# Patient Record
Sex: FEMALE | Race: WHITE | NOT HISPANIC OR LATINO | Employment: FULL TIME | URBAN - METROPOLITAN AREA
[De-identification: names, ages, dates, MRNs, and addresses within clinical notes are randomized per-mention and may not be internally consistent; named-entity substitution may affect disease eponyms.]

---

## 2023-11-10 ENCOUNTER — TELEPHONE (OUTPATIENT)
Dept: NEUROSURGERY | Facility: CLINIC | Age: 63
End: 2023-11-10
Payer: COMMERCIAL

## 2023-11-10 ENCOUNTER — PATIENT MESSAGE (OUTPATIENT)
Dept: NEUROSURGERY | Facility: CLINIC | Age: 63
End: 2023-11-10
Payer: COMMERCIAL

## 2023-11-10 NOTE — TELEPHONE ENCOUNTER
Spoke with patient in regards to reschedule appointment for today, due to MD having an ER surgery case. She has been scheduled for 11/14 @ 2PM for an appointment. Patient confirmed appointment date and time. She will be bringing in her imaging disc and records.

## 2023-11-14 ENCOUNTER — TELEPHONE (OUTPATIENT)
Dept: NEUROSURGERY | Facility: CLINIC | Age: 63
End: 2023-11-14
Payer: COMMERCIAL

## 2023-11-14 ENCOUNTER — OFFICE VISIT (OUTPATIENT)
Dept: NEUROSURGERY | Facility: CLINIC | Age: 63
End: 2023-11-14
Payer: COMMERCIAL

## 2023-11-14 ENCOUNTER — HOSPITAL ENCOUNTER (OUTPATIENT)
Dept: RADIOLOGY | Facility: HOSPITAL | Age: 63
Discharge: HOME | End: 2023-11-14
Attending: NEUROLOGICAL SURGERY
Payer: COMMERCIAL

## 2023-11-14 VITALS
SYSTOLIC BLOOD PRESSURE: 164 MMHG | HEIGHT: 66 IN | DIASTOLIC BLOOD PRESSURE: 87 MMHG | WEIGHT: 179.69 LBS | HEART RATE: 114 BPM | BODY MASS INDEX: 28.88 KG/M2

## 2023-11-14 DIAGNOSIS — M41.50 SCOLIOSIS DUE TO DEGENERATIVE DISEASE OF SPINE IN ADULT PATIENT: ICD-10-CM

## 2023-11-14 DIAGNOSIS — M50.30 DEGENERATIVE DISC DISEASE, CERVICAL: ICD-10-CM

## 2023-11-14 DIAGNOSIS — T14.8XXA MUSCULOLIGAMENTOUS STRAIN: ICD-10-CM

## 2023-11-14 DIAGNOSIS — G56.00 CARPAL TUNNEL SYNDROME, UNSPECIFIED LATERALITY: ICD-10-CM

## 2023-11-14 DIAGNOSIS — M50.30 DEGENERATIVE DISC DISEASE, CERVICAL: Primary | ICD-10-CM

## 2023-11-14 PROCEDURE — 4010F ACE/ARB THERAPY RXD/TAKEN: CPT | Mod: CPTII,S$GLB,, | Performed by: NEUROLOGICAL SURGERY

## 2023-11-14 PROCEDURE — 72084 X-RAY EXAM ENTIRE SPI 6/> VW: CPT | Mod: 26,,, | Performed by: RADIOLOGY

## 2023-11-14 PROCEDURE — 3077F SYST BP >= 140 MM HG: CPT | Mod: CPTII,S$GLB,, | Performed by: NEUROLOGICAL SURGERY

## 2023-11-14 PROCEDURE — 3044F HG A1C LEVEL LT 7.0%: CPT | Mod: CPTII,S$GLB,, | Performed by: NEUROLOGICAL SURGERY

## 2023-11-14 PROCEDURE — 72082 X-RAY EXAM ENTIRE SPI 2/3 VW: CPT | Mod: TC

## 2023-11-14 PROCEDURE — 3079F DIAST BP 80-89 MM HG: CPT | Mod: CPTII,S$GLB,, | Performed by: NEUROLOGICAL SURGERY

## 2023-11-14 PROCEDURE — 99999 PR PBB SHADOW E&M-EST. PATIENT-LVL III: CPT | Mod: PBBFAC,,, | Performed by: NEUROLOGICAL SURGERY

## 2023-11-14 PROCEDURE — 72084 XR SPINE SURVEY AP AND LATERAL: ICD-10-PCS | Mod: 26,,, | Performed by: RADIOLOGY

## 2023-11-14 PROCEDURE — 72050 X-RAY EXAM NECK SPINE 4/5VWS: CPT | Mod: TC,59

## 2023-11-14 PROCEDURE — 3061F NEG MICROALBUMINURIA REV: CPT | Mod: CPTII,S$GLB,, | Performed by: NEUROLOGICAL SURGERY

## 2023-11-14 PROCEDURE — 99204 OFFICE O/P NEW MOD 45 MIN: CPT | Mod: S$GLB,,, | Performed by: NEUROLOGICAL SURGERY

## 2023-11-14 PROCEDURE — 3066F NEPHROPATHY DOC TX: CPT | Mod: CPTII,S$GLB,, | Performed by: NEUROLOGICAL SURGERY

## 2023-11-14 PROCEDURE — 3008F BODY MASS INDEX DOCD: CPT | Mod: CPTII,S$GLB,, | Performed by: NEUROLOGICAL SURGERY

## 2023-11-14 NOTE — PROGRESS NOTES
Subjective:      Patient ID: Elina Franco is a 63 y.o. female.    Chief Complaint: Follow-up (Pt visit for surgery consult rt neck pain 7/10 numbness and tingling in hands and feet pain raditing down center back to lower back , hips and legs ) and Cervical Spine Pain (C-spine)    Patient presents as new evaluation for both neck as well as lower back pain  Imaging completed on out the disc and she has those to be uploaded into our radiology system     She complains of neck pain rated 7/10  She has numbness and tingling traveling from her neck into her hands bilaterally   tried epidural steroid injections in the past for her neck  Medications have not been able to alleviate the symptoms    In terms of her lower back she has lower back as well as pain radiating down to her feet through the hips knees  Associated with numbness and tingling    No prior spinal surgery      Pt here for neck and back       1. XR [facility] AP   2. Upload CD into PACS   3. NCS           Review of Systems   Constitutional:  Negative for activity change, appetite change and chills.   HENT:  Negative for hearing loss, sore throat and tinnitus.    Eyes:  Negative for pain, discharge and itching.   Cardiovascular:  Negative for chest pain.   Gastrointestinal:  Negative for abdominal pain.   Endocrine: Negative for cold intolerance and heat intolerance.   Genitourinary:  Negative for difficulty urinating and dysuria.   Musculoskeletal:  Positive for back pain and gait problem.   Allergic/Immunologic: Negative for environmental allergies.   Neurological:  Positive for weakness. Negative for dizziness, tremors, light-headedness and headaches.   Hematological:  Negative for adenopathy.   Psychiatric/Behavioral:  Negative for agitation, behavioral problems and confusion.          Objective:       Neurosurgery Physical Exam  Ortho/SPM Exam  Ortho Exam        Assessment:     1. Degenerative disc disease, cervical    2. Carpal tunnel syndrome, unspecified  laterality    3. Musculoligamentous strain    4. Scoliosis due to degenerative disease of spine in adult patient      Plan:     Degenerative disc disease, cervical  -     X-Ray Spine Scoliosis 4 or 5 views; Future; Expected date: 11/14/2023  -     EMG W/ ULTRASOUND AND NERVE CONDUCTION TEST 2 Extremities; Future    Carpal tunnel syndrome, unspecified laterality  -     X-Ray Spine Scoliosis 4 or 5 views; Future; Expected date: 11/14/2023  -     EMG W/ ULTRASOUND AND NERVE CONDUCTION TEST 2 Extremities; Future    Musculoligamentous strain  -     X-Ray Spine Scoliosis 4 or 5 views; Future; Expected date: 11/14/2023  -     EMG W/ ULTRASOUND AND NERVE CONDUCTION TEST 2 Extremities; Future    Scoliosis due to degenerative disease of spine in adult patient  -     X-Ray Spine Scoliosis 4 or 5 views; Future; Expected date: 11/14/2023  -     EMG W/ ULTRASOUND AND NERVE CONDUCTION TEST 2 Extremities; Future      Patient with multiple pain generators but her symptoms in the cervical spine seemed to be the main complaint for her  Would like to get an EMG nerve conduction study to rule out any underlying peripheral nerve compression which goes along with the cervical degenerative disc disease she has through multiple levels  Symptoms in the cervical spine are worse at the C4-5, C5-6, C6-7 levels with the C5-6 level being the worst    I would also get like to get standing AP x-rays of the spinal axis to evaluate her overall sagittal alignment    Follow-up in clinic after discuss findings and discuss future surgical treatment options    Thank you for the referral   Please call with any questions    Solis Morales MD  Neurosurgery     Disclaimer: This note was prepared using a voice recognition system and is likely to have sound alike errors within the text.

## 2023-11-14 NOTE — TELEPHONE ENCOUNTER
----- Message from Saundra José sent at 11/14/2023  1:28 PM CST -----  .Type:  Patient Call Back    Who Called: PT        Does the patient know what this is regarding?: PT WOULD LIKE TO KNOW CAN SHE BE BILLED FOR TODAY'S VISIT? PT WOULD LIKE TO KNOW BEFORE SHE COMES TO THE VISIT     Would the patient rather a call back YES     Best Call Back Number:   [phone]       Additional Information: Thank You

## 2023-11-14 NOTE — TELEPHONE ENCOUNTER
----- Message from Saundra José sent at 11/14/2023  1:28 PM CST -----  .Type:  Patient Call Back    Who Called: PT        Does the patient know what this is regarding?: PT WOULD LIKE TO KNOW CAN SHE BE BILLED FOR TODAY'S VISIT? PT WOULD LIKE TO KNOW BEFORE SHE COMES TO THE VISIT     Would the patient rather a call back YES     Best Call Back Number:   [phone]       Additional Information: Thank You

## 2023-11-15 ENCOUNTER — PATIENT MESSAGE (OUTPATIENT)
Dept: NEUROSURGERY | Facility: CLINIC | Age: 63
End: 2023-11-15
Payer: COMMERCIAL

## 2023-11-15 ENCOUNTER — TELEPHONE (OUTPATIENT)
Dept: PHYSICAL MEDICINE AND REHAB | Facility: CLINIC | Age: 63
End: 2023-11-15
Payer: COMMERCIAL

## 2023-11-15 NOTE — TELEPHONE ENCOUNTER
----- Message from Shy Boucher MA sent at 11/15/2023  1:05 PM CST -----  Regarding: EMG  Didier Herrera    Can you please schedule patient for EMG orders are in. Thanks in advance.

## 2023-11-28 ENCOUNTER — OFFICE VISIT (OUTPATIENT)
Dept: PHYSICAL MEDICINE AND REHAB | Facility: CLINIC | Age: 63
End: 2023-11-28
Payer: COMMERCIAL

## 2023-11-28 VITALS
WEIGHT: 179 LBS | HEART RATE: 83 BPM | HEIGHT: 66 IN | BODY MASS INDEX: 28.77 KG/M2 | DIASTOLIC BLOOD PRESSURE: 83 MMHG | SYSTOLIC BLOOD PRESSURE: 146 MMHG | RESPIRATION RATE: 13 BRPM

## 2023-11-28 DIAGNOSIS — G56.03 BILATERAL CARPAL TUNNEL SYNDROME: Primary | ICD-10-CM

## 2023-11-28 DIAGNOSIS — M54.12 CERVICAL RADICULOPATHY: ICD-10-CM

## 2023-11-28 PROBLEM — I15.1 HYPERTENSION SECONDARY TO OTHER RENAL DISORDERS: Status: ACTIVE | Noted: 2018-09-26

## 2023-11-28 PROBLEM — Z78.9 STATIN INTOLERANCE: Status: ACTIVE | Noted: 2018-04-10

## 2023-11-28 PROBLEM — F41.9 ANXIETY: Status: ACTIVE | Noted: 2021-04-19

## 2023-11-28 PROBLEM — F33.1 MODERATE EPISODE OF RECURRENT MAJOR DEPRESSIVE DISORDER: Status: ACTIVE | Noted: 2023-11-28

## 2023-11-28 PROBLEM — F51.04 PSYCHOPHYSIOLOGICAL INSOMNIA: Status: ACTIVE | Noted: 2020-04-09

## 2023-11-28 PROBLEM — M15.9 PRIMARY OSTEOARTHRITIS INVOLVING MULTIPLE JOINTS: Status: ACTIVE | Noted: 2019-02-28

## 2023-11-28 PROBLEM — G25.81 RESTLESS LEG SYNDROME: Status: ACTIVE | Noted: 2021-04-19

## 2023-11-28 PROBLEM — N18.2 CHRONIC KIDNEY DISEASE, STAGE 2 (MILD): Status: ACTIVE | Noted: 2018-07-10

## 2023-11-28 PROBLEM — E78.2 MIXED HYPERLIPIDEMIA: Status: ACTIVE | Noted: 2023-11-28

## 2023-11-28 PROBLEM — R73.01 IMPAIRED FASTING GLUCOSE: Status: ACTIVE | Noted: 2023-11-28

## 2023-11-28 PROBLEM — M50.30 DDD (DEGENERATIVE DISC DISEASE), CERVICAL: Status: ACTIVE | Noted: 2019-02-28

## 2023-11-28 PROBLEM — N04.0: Status: ACTIVE | Noted: 2019-11-04

## 2023-11-28 PROBLEM — M15.0 PRIMARY OSTEOARTHRITIS INVOLVING MULTIPLE JOINTS: Status: ACTIVE | Noted: 2019-02-28

## 2023-11-28 PROBLEM — K21.9 GERD (GASTROESOPHAGEAL REFLUX DISEASE): Status: ACTIVE | Noted: 2023-11-28

## 2023-11-28 PROCEDURE — 95886 MUSC TEST DONE W/N TEST COMP: CPT | Mod: S$GLB,,, | Performed by: PHYSICAL MEDICINE & REHABILITATION

## 2023-11-28 PROCEDURE — 99999 PR PBB SHADOW E&M-EST. PATIENT-LVL III: CPT | Mod: PBBFAC,,, | Performed by: PHYSICAL MEDICINE & REHABILITATION

## 2023-11-28 PROCEDURE — 99499 UNLISTED E&M SERVICE: CPT | Mod: S$GLB,,, | Performed by: PHYSICAL MEDICINE & REHABILITATION

## 2023-11-28 PROCEDURE — 99999 PR PBB SHADOW E&M-EST. PATIENT-LVL III: ICD-10-PCS | Mod: PBBFAC,,, | Performed by: PHYSICAL MEDICINE & REHABILITATION

## 2023-11-28 PROCEDURE — 95886 PR EMG COMPLETE, W/ NERVE CONDUCTION STUDIES, 5+ MUSCLES: ICD-10-PCS | Mod: S$GLB,,, | Performed by: PHYSICAL MEDICINE & REHABILITATION

## 2023-11-28 PROCEDURE — 99499 NO LOS: ICD-10-PCS | Mod: S$GLB,,, | Performed by: PHYSICAL MEDICINE & REHABILITATION

## 2023-11-28 PROCEDURE — 95912 PR NERVE CONDUCTION STUDY; 11 -12 STUDIES: ICD-10-PCS | Mod: S$GLB,,, | Performed by: PHYSICAL MEDICINE & REHABILITATION

## 2023-11-28 PROCEDURE — 95912 NRV CNDJ TEST 11-12 STUDIES: CPT | Mod: S$GLB,,, | Performed by: PHYSICAL MEDICINE & REHABILITATION

## 2023-11-28 NOTE — PROGRESS NOTES
[facility]   [id number]  [place] [address]  Phone: [phone]        Full Name: Elina Franco YOB: 1960  Patient ID: [id number]      Visit Date: 11/28/2023 13:40  Age: 63 Years 5 Months Old  Examining Physician: Henrietta Aldrich M.D.  Referring Physician:   Reason for Referral: ue pain          Chief Complaint   Patient presents with    Numbness       HPI: This is a 63 y.o.  female being seen in clinic today for evaluation of chronic arm achy pain with numbness/tingling into her fingers.  She has a history of CTR years ago as well as cervical DJD/DDD.  With increased hand/arm usage her symptoms can worsen.  She also has night time awakening from pain    History obtained from patient    Past family, medical, social, and surgical history reviewed in chart    Review of Systems:     General- denies lethargy, weight change, fever, chills  Head/neck- denies swallowing difficulties  ENT- denies hearing changes  Cardiovascular-denies chest pain  Pulmonary- denies shortness of breath  GI- denies constipation or bowel incontinence  - denies bladder incontinence  Skin- denies wounds or rashes  Musculoskeletal- denies weakness, + pain  Neurologic- + numbness and tingling  Psychiatric- denies depressive or psychotic features, + anxiety  Lymphatic-denies swelling  Endocrine- denies hypoglycemic symptoms/DM history  All other pertinent systems negative     Physical Examination:  General: Well developed, well nourished female, NAD  HEENT:NCAT EOMI bilaterally   Pulmonary:Normal respirations    Spinal Examination: CERVICAL  Active ROM is within normal limits.  Inspection: No deformity of spinal alignment.    Musculoskeletal Tests:  Phalen: neg  Elbow compression (ulnar): neg  Tinels at wrist: neg    Bilateral Upper and Lower Extremities:  Pulses are 2+ at radial bilaterally.  Shoulder/Elbow/Wrist/Hand ROM wnl  Hip/Knee/Ankle ROM   Bilateral Extremities show normal capillary refill.   No signs of cyanosis, rubor, edema, skin changes, or dysvascular changes of appendages.  Nails appear intact.    Neurological Exam:  Cranial Nerves:  II-XII grossly intact    Manual Muscle Testing: (Motor 5=normal)  5/5 strength bilateral upper extremities    No focal atrophy is noted of either upper extremity.    Bilateral Reflexes:  Sebastian's response is absent bilaterally.  Sensation: tested to light touch  - intact in arms.    Gait: Narrow base and good arm swing.      Entire procedure explained to patient prior to proceeding.  Verbal consent obtained      SNC      Nerve / Sites Rec. Site Onset Lat Peak Lat Amp Segments Distance Velocity     ms ms µV  mm m/s   R Median - Digit II (Antidromic)      Wrist Dig II 3.1 3.8 12.8 Wrist - Dig  46   L Median - Digit II (Antidromic)      Wrist Dig II 3.1 4.0 17.7 Wrist - Dig  45   R Ulnar - Digit V (Antidromic)      Wrist Dig V 2.8 3.4 12.4 Wrist - Dig V 140 51   L Ulnar - Digit V (Antidromic)      Wrist Dig V 2.9 3.6 21.6 Wrist - Dig V 140 48   R Radial - Anatomical snuff box (Forearm)      Forearm Wrist 1.9 2.2 16.6 Forearm - Wrist 100 52   L Radial - Anatomical snuff box (Forearm)      Forearm Wrist 1.7 2.4 13.8 Forearm - Wrist 100 60       CSI      Nerve / Sites Rec. Site Peak Lat NP Amp Segments Peak Diff     ms µV  ms   R Median - CSI      Median Thumb 3.2 14.8 Median - Radial 0.6      Radial Thumb 2.6 0.79 Median - Ulnar 0.6      Median Ring 4.2 9.0 Median palm - Ulnar palm       Ulnar Ring 3.5 16.6        CSI    CSI 1.2       MNC      Nerve / Sites Muscle Latency Amplitude Duration Rel Amp Segments Distance Lat Diff Velocity     ms mV ms %  mm ms m/s   R Median - APB      Wrist APB 3.8 8.4 6.0 100 Wrist - APB 80        Elbow APB 7.9 8.0 6.2 95.1 Elbow - Wrist 200 4.2 48   L Median - APB      Wrist APB 3.6 7.8 6.0 100 Wrist - APB 80        Elbow APB 7.9 7.5 6.5 96.2 Elbow - Wrist 200 4.2 47   R Ulnar - ADM      Wrist ADM 2.9 9.0 5.8 100 Wrist -          B. Elbow ADM 6.5 8.1 6.0 90.4 B. Elbow - Wrist 210 3.5 59      A. Elbow ADM 8.5 7.1 6.3 87.7 A. Elbow - B. Elbow 110 2.1 53   L Ulnar - ADM      Wrist ADM 3.0 10.1 6.4 100 Wrist -         B. Elbow ADM 6.7 10.0 6.7 98.3 B. Elbow - Wrist 200 3.6 55      A. Elbow ADM 8.7 9.7 7.4 96.9 A. Elbow - B. Elbow 110 2.0 54       EMG         EMG Summary Table     Spontaneous MUAP Recruitment   Muscle IA Fib PSW Fasc Other Dur. Dur Amp Dur Polys Pattern Effort   R. First dorsal interosseous N None None None . _NFT_ _NFT_ N N N N Max   R. Pronator teres N None None None . _NFT_ _NFT_ N Sl Incr N N Max   R. Brachioradialis N None None None . _NFT_ _NFT_ N N 1+ sl red Max   R. Biceps brachii N None None None . _NFT_ _NFT_ N N 1+ sl red Max   R. Triceps brachii N None None None . _NFT_ _NFT_ N N N N Max   R. Deltoid N None None None . _NFT_ _NFT_ N N 1+ sl red Max                                                INTERPRETATION  -Bilateral median motor nerve conduction study showed normal latency, amplitude, and dec conduction velocity  -Bilateral median sensory nerve conduction study showed prolonged peak latency and normal amplitude  -Bilateral ulnar motor nerve conduction study showed normal latency, amplitude, and conduction velocity  -Bilateral ulnar sensory nerve conduction study showed normal peak latency and amplitude  -Bilateral radial sensory nerve conduction study showed normal peak latency and amplitude  -right combined sensory index was significant  -Needle EMG examination performed to above mentioned muscles       IMPRESSION  ABNORMAL study  2. There is electrodiagnostic evidence of a mild-moderate demyelinating median neuropathy (Carpal tunnel syndrome) across bilateral wrists. There is a chronic radiculopathy of the C5, C6 nerve roots    PLAN  Discussed in detail for greater than 30 minutes about diagnosis and treatment plan    1. Follow up with referring provider: Dr. Solis Morales  2. Handouts on  cervical radic and CTS provided  3. This study is good for one year. If symptoms worsen or do not improve, please re-consult.    Henrietta Aldrich M.D.  Physical Medicine and Rehab

## 2023-11-30 ENCOUNTER — PATIENT MESSAGE (OUTPATIENT)
Dept: NEUROSURGERY | Facility: CLINIC | Age: 63
End: 2023-11-30
Payer: COMMERCIAL

## 2024-01-05 ENCOUNTER — OFFICE VISIT (OUTPATIENT)
Dept: NEUROSURGERY | Facility: CLINIC | Age: 64
End: 2024-01-05
Payer: COMMERCIAL

## 2024-01-05 DIAGNOSIS — M50.30 DEGENERATIVE DISC DISEASE, CERVICAL: ICD-10-CM

## 2024-01-05 DIAGNOSIS — M54.12 CERVICAL RADICULOPATHY: ICD-10-CM

## 2024-01-05 DIAGNOSIS — M48.02 CERVICAL STENOSIS OF SPINAL CANAL: Primary | ICD-10-CM

## 2024-01-05 DIAGNOSIS — G56.03 BILATERAL CARPAL TUNNEL SYNDROME: ICD-10-CM

## 2024-01-05 PROCEDURE — 99213 OFFICE O/P EST LOW 20 MIN: CPT | Mod: S$PBB,,, | Performed by: NEUROLOGICAL SURGERY

## 2024-01-05 PROCEDURE — 4010F ACE/ARB THERAPY RXD/TAKEN: CPT | Mod: ,,, | Performed by: NEUROLOGICAL SURGERY

## 2024-01-05 PROCEDURE — 3061F NEG MICROALBUMINURIA REV: CPT | Mod: ,,, | Performed by: NEUROLOGICAL SURGERY

## 2024-01-05 PROCEDURE — 3066F NEPHROPATHY DOC TX: CPT | Mod: ,,, | Performed by: NEUROLOGICAL SURGERY

## 2024-01-05 PROCEDURE — 99999 PR PBB SHADOW E&M-EST. PATIENT-LVL III: CPT | Mod: PBBFAC,,, | Performed by: NEUROLOGICAL SURGERY

## 2024-01-05 RX ORDER — METHOCARBAMOL 500 MG/1
500 TABLET, FILM COATED ORAL 4 TIMES DAILY
Qty: 40 TABLET | Refills: 0 | Status: SHIPPED | OUTPATIENT
Start: 2024-01-05 | End: 2024-01-15

## 2024-01-05 NOTE — PROGRESS NOTES
Subjective:      Patient ID: Elina Franco is a 63 y.o. female.    Chief Complaint: Follow-up (Pain is located down center of back. Pain is 8/10)    Patient presents follows up for her neck pain with radiculopathy in bilateral extremities. She endorses pain in neck and mid upper spine. She states pain is 8/10. Reports pain is constant. She is s/p NCS. It demonstrated carpal tunnel syndrome bilaterally. Most severe stenosis at C4-5 and C5-6 per X ray, additionally collapsed / degenenative disc changes. Patient has issues with ROM. Pain exacerbated with rotation to right/ left.   On exam, patient with atrophy in right hand (thenar region); weakness in  BL        Review of Systems   Constitutional:  Negative for activity change, appetite change and chills.   HENT:  Negative for congestion and ear pain.    Eyes:  Negative for photophobia, redness and visual disturbance.   Respiratory:  Negative for apnea and chest tightness.    Cardiovascular:  Negative for chest pain.   Gastrointestinal:  Negative for abdominal distention and abdominal pain.   Endocrine: Negative for cold intolerance.   Genitourinary:  Negative for difficulty urinating.   Musculoskeletal:  Positive for myalgias, neck pain and neck stiffness. Negative for arthralgias.   Skin:  Negative for color change.   Allergic/Immunologic: Negative for environmental allergies.   Neurological:  Positive for weakness and numbness. Negative for dizziness.   Hematological:  Negative for adenopathy. Does not bruise/bleed easily.   Psychiatric/Behavioral:  Negative for agitation, behavioral problems and confusion.          Objective:       Physical Exam:  Nursing note and vitals reviewed.    Constitutional: She appears well-nourished. No distress.     Eyes: EOM are normal.     Cardiovascular: Normal rate.     Psych/Behavior: She is alert. She is oriented to person, place, and time. She has a normal mood and affect.     Musculoskeletal:        Neck: Range of motion is  limited. There is tenderness. Muscle strength is 5/5.        Right Upper Extremities: There is no tenderness. Muscle strength is 5/5.        Left Upper Extremities: There is no tenderness. Muscle strength is 5/5.     Neurological:        Sensory: There is no sensory deficit in the trunk. There is no sensory deficit in the extremities.        Cranial nerves: Cranial nerve(s) II, III, IV, V, VI, VII, VIII, IX, X, XI and XII are intact.     General    Nursing note and vitals reviewed.  Constitutional: She is oriented to person, place, and time. She appears well-nourished. No distress.   Eyes: EOM are normal.   Cardiovascular:  Normal rate.            Neurological: She is alert and oriented to person, place, and time.   Psychiatric: She has a normal mood and affect.             Ortho Exam      X-ray cervical spine    FINDINGS:  Vertebral body heights maintained.  No spondylolisthesis.  Degenerative disc height loss and both anterior and posterior osteophyte changes noted at C4-5 and C5-6.  Uncovertebral spurring noted at these levels causing mild neural foraminal narrowing.  No high-grade neural foraminal narrowing present.  Facet arthropathy noted.  Soft tissues unremarkable.     Impression:     Degenerative findings most prevalent at C4-5 and C5-6.          I  reviewed all pertinent imaging regarding this case.  Assessment:     1. Cervical stenosis of spinal canal    2. Bilateral carpal tunnel syndrome    3. Degenerative disc disease, cervical    4. Cervical radiculopathy      Plan:     Cervical stenosis of spinal canal  -     Ambulatory referral/consult to Physical/Occupational Therapy; Future; Expected date: 01/12/2024  -     Ambulatory referral/consult to Pain Clinic; Future; Expected date: 01/12/2024  -     Ambulatory referral/consult to Physical/Occupational Therapy; Future; Expected date: 01/12/2024    Bilateral carpal tunnel syndrome    Degenerative disc disease, cervical    Cervical radiculopathy    Other  orders  -     methocarbamoL (ROBAXIN) 500 MG Tab; Take 1 tablet (500 mg total) by mouth 4 (four) times daily. for 10 days  Dispense: 40 tablet; Refill: 0        Given her neck pain is multifactorial, advise JOJO (cervical) to see determine cervical etiology versus Carpal tunnel syndrome.     Target c6-7 intralaminar and up. Follow up 3-4 weeks after injections    Physical therapy with traction        Thank you for the referral   Please call with any questions    Solis Morales MD  Neurosurgery     Disclaimer: This note was prepared using a voice recognition system and is likely to have sound alike errors within the text.

## 2024-02-05 ENCOUNTER — TELEPHONE (OUTPATIENT)
Dept: PAIN MEDICINE | Facility: CLINIC | Age: 64
End: 2024-02-05
Payer: MEDICARE

## 2024-02-05 ENCOUNTER — PATIENT MESSAGE (OUTPATIENT)
Dept: NEUROSURGERY | Facility: CLINIC | Age: 64
End: 2024-02-05
Payer: MEDICARE

## 2024-02-05 NOTE — TELEPHONE ENCOUNTER
Pt call wanting to know if she would receive an injection on her New pt appointment with Dr González blackwood 2/6/24.    .Delai Carranza [facility]

## 2024-02-05 NOTE — TELEPHONE ENCOUNTER
----- Message from Teto Lo MA sent at 2/5/2024  1:49 PM CST -----  Contact: Pt  Patient wants instruction for procedure tomorrow  ----- Message -----  From: Doug Hurtado  Sent: 2/5/2024  12:30 PM CST  To: [facility] Staff    pt states she is coming in tomorrow for an injection for neck pain and is calling to get the instructions on what she should do to prepare & can be reached at [phone] & pt portal //thanks/Dbw

## 2024-02-06 ENCOUNTER — OFFICE VISIT (OUTPATIENT)
Dept: PAIN MEDICINE | Facility: CLINIC | Age: 64
End: 2024-02-06
Payer: COMMERCIAL

## 2024-02-06 VITALS
WEIGHT: 178.81 LBS | SYSTOLIC BLOOD PRESSURE: 133 MMHG | HEIGHT: 66 IN | BODY MASS INDEX: 28.74 KG/M2 | RESPIRATION RATE: 17 BRPM | HEART RATE: 76 BPM | DIASTOLIC BLOOD PRESSURE: 81 MMHG

## 2024-02-06 DIAGNOSIS — M48.02 CERVICAL STENOSIS OF SPINAL CANAL: ICD-10-CM

## 2024-02-06 DIAGNOSIS — M54.12 CERVICAL RADICULOPATHY: Primary | ICD-10-CM

## 2024-02-06 DIAGNOSIS — M50.90 CERVICAL DISC DISEASE: ICD-10-CM

## 2024-02-06 DIAGNOSIS — M47.812 CERVICAL SPONDYLOSIS: ICD-10-CM

## 2024-02-06 PROCEDURE — 3061F NEG MICROALBUMINURIA REV: CPT | Mod: CPTII,S$GLB,, | Performed by: ANESTHESIOLOGY

## 2024-02-06 PROCEDURE — 3008F BODY MASS INDEX DOCD: CPT | Mod: CPTII,S$GLB,, | Performed by: ANESTHESIOLOGY

## 2024-02-06 PROCEDURE — 99999 PR PBB SHADOW E&M-EST. PATIENT-LVL III: CPT | Mod: PBBFAC,,, | Performed by: ANESTHESIOLOGY

## 2024-02-06 PROCEDURE — 1159F MED LIST DOCD IN RCRD: CPT | Mod: CPTII,S$GLB,, | Performed by: ANESTHESIOLOGY

## 2024-02-06 PROCEDURE — 99204 OFFICE O/P NEW MOD 45 MIN: CPT | Mod: S$GLB,,, | Performed by: ANESTHESIOLOGY

## 2024-02-06 PROCEDURE — 3075F SYST BP GE 130 - 139MM HG: CPT | Mod: CPTII,S$GLB,, | Performed by: ANESTHESIOLOGY

## 2024-02-06 PROCEDURE — 3066F NEPHROPATHY DOC TX: CPT | Mod: CPTII,S$GLB,, | Performed by: ANESTHESIOLOGY

## 2024-02-06 PROCEDURE — 4010F ACE/ARB THERAPY RXD/TAKEN: CPT | Mod: CPTII,S$GLB,, | Performed by: ANESTHESIOLOGY

## 2024-02-06 PROCEDURE — 3079F DIAST BP 80-89 MM HG: CPT | Mod: CPTII,S$GLB,, | Performed by: ANESTHESIOLOGY

## 2024-02-06 RX ORDER — PREGABALIN 50 MG/1
50 CAPSULE ORAL 2 TIMES DAILY
Qty: 60 CAPSULE | Refills: 1 | Status: SHIPPED | OUTPATIENT
Start: 2024-02-06
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
## 2024-02-13 NOTE — PRE-PROCEDURE INSTRUCTIONS
Spoke with patient regarding procedure scheduled on 2.21     Arrival time 0645     Has patient been sick with fever or on antibiotics within the last 7 days? No     Does the patient have any open wounds, sores or rashes? No     Does the patient have any recent fractures? no     Has patient received a vaccination within the last 7 days? No     Received the COVID vaccination?      Has the patient stopped all medications as directed? na     Does patient have a pacemaker, defibrillator, or implantable stimulator? No     Does the patient have a ride to and from procedure and someone reliable to remain with patient?      Is the patient diabetic? no     Does the patient have sleep apnea? Or use O2 at home? griselda cpap     Is the patient receiving sedation?      Is the patient instructed to remain NPO beginning at midnight the night before their procedure? yes     Procedure location confirmed with patient? Yes     Covid- Denies signs/symptoms. Instructed to notify PAT/MD if any changes.

## 2024-02-21 ENCOUNTER — HOSPITAL ENCOUNTER (OUTPATIENT)
Facility: HOSPITAL | Age: 64
Discharge: HOME | End: 2024-02-21
Attending: ANESTHESIOLOGY | Admitting: ANESTHESIOLOGY
Payer: COMMERCIAL

## 2024-02-21 VITALS
TEMPERATURE: 97 F | WEIGHT: 178.81 LBS | DIASTOLIC BLOOD PRESSURE: 68 MMHG | BODY MASS INDEX: 28.74 KG/M2 | HEIGHT: 66 IN | OXYGEN SATURATION: 94 % | HEART RATE: 83 BPM | RESPIRATION RATE: 16 BRPM | SYSTOLIC BLOOD PRESSURE: 123 MMHG

## 2024-02-21 DIAGNOSIS — M54.12 CERVICAL RADICULOPATHY: ICD-10-CM

## 2024-02-21 LAB — POCT GLUCOSE: 147 MG/DL (ref 70–110)

## 2024-02-21 PROCEDURE — 62321 NJX INTERLAMINAR CRV/THRC: CPT | Mod: ,,, | Performed by: ANESTHESIOLOGY

## 2024-02-21 PROCEDURE — 25000003 PHARM REV CODE 250: Performed by: ANESTHESIOLOGY

## 2024-02-21 PROCEDURE — 63600175 PHARM REV CODE 636 W HCPCS: Performed by: ANESTHESIOLOGY

## 2024-02-21 PROCEDURE — 25500020 PHARM REV CODE 255: Performed by: ANESTHESIOLOGY

## 2024-02-21 PROCEDURE — 82962 GLUCOSE BLOOD TEST: CPT | Performed by: ANESTHESIOLOGY

## 2024-02-21 PROCEDURE — 62321 NJX INTERLAMINAR CRV/THRC: CPT | Performed by: ANESTHESIOLOGY

## 2024-02-21 RX ORDER — SERTRALINE HYDROCHLORIDE 50 MG/1
50 TABLET, FILM COATED ORAL DAILY
COMMUNITY

## 2024-02-21 RX ORDER — ROSUVASTATIN CALCIUM 40 MG/1
10 TABLET, COATED ORAL NIGHTLY
COMMUNITY

## 2024-02-21 RX ORDER — ICOSAPENT ETHYL 1 G/1
2 CAPSULE ORAL 2 TIMES DAILY
COMMUNITY

## 2024-02-21 RX ORDER — ZOLPIDEM TARTRATE 10 MG/1
5 TABLET ORAL NIGHTLY PRN
COMMUNITY

## 2024-02-21 RX ORDER — GLIMEPIRIDE 1 MG/1
1 TABLET ORAL
COMMUNITY

## 2024-02-21 RX ORDER — FENTANYL CITRATE 50 UG/ML
INJECTION, SOLUTION INTRAMUSCULAR; INTRAVENOUS
Status: DISCONTINUED | OUTPATIENT
Start: 2024-02-21 | End: 2024-02-21 | Stop reason: HOSPADM

## 2024-02-21 RX ORDER — DEXAMETHASONE SODIUM PHOSPHATE 10 MG/ML
INJECTION INTRAMUSCULAR; INTRAVENOUS
Status: DISCONTINUED | OUTPATIENT
Start: 2024-02-21 | End: 2024-02-21 | Stop reason: HOSPADM

## 2024-02-21 RX ORDER — MIDAZOLAM HYDROCHLORIDE 1 MG/ML
INJECTION, SOLUTION INTRAMUSCULAR; INTRAVENOUS
Status: DISCONTINUED | OUTPATIENT
Start: 2024-02-21 | End: 2024-02-21 | Stop reason: HOSPADM

## 2024-02-21 RX ORDER — LIDOCAINE HYDROCHLORIDE 10 MG/ML
INJECTION, SOLUTION EPIDURAL; INFILTRATION; INTRACAUDAL; PERINEURAL
Status: DISCONTINUED | OUTPATIENT
Start: 2024-02-21 | End: 2024-02-21 | Stop reason: HOSPADM

## 2024-02-21 RX ORDER — IRBESARTAN 75 MG/1
75 TABLET ORAL NIGHTLY
COMMUNITY

## 2024-02-21 RX ORDER — ONDANSETRON HYDROCHLORIDE 2 MG/ML
4 INJECTION, SOLUTION INTRAVENOUS ONCE AS NEEDED
Status: DISCONTINUED | OUTPATIENT
Start: 2024-02-21 | End: 2024-02-21 | Stop reason: HOSPADM

## 2024-02-21 NOTE — DISCHARGE INSTRUCTIONS

## 2024-02-21 NOTE — H&P
"HPI  Patient presenting for Procedure(s) (LRB):  C6/7 IL JOJO (N/A)     Patient on Anti-coagulation No    No health changes since previous encounter    Past Medical History:   Diagnosis Date    Hypertension      Past Surgical History:   Procedure Laterality Date    HYSTERECTOMY       Review of patient's allergies indicates:   Allergen Reactions    Statins-hmg-coa reductase inhibitors Swelling and Other (See Comments)     Lower extremity cramp with pravastatin in 2016.    Sulfa (sulfonamide antibiotics)         No current facility-administered medications on file prior to encounter.     Current Outpatient Medications on File Prior to Encounter   Medication Sig Dispense Refill    glimepiride (AMARYL) 1 MG tablet Take 1 mg by mouth before breakfast.      icosapent ethyL (VASCEPA) 1 gram Cap Take 2 g by mouth 2 (two) times daily.      irbesartan (AVAPRO) 75 MG tablet Take 75 mg by mouth every evening.      pregabalin (LYRICA) 50 MG capsule Take 1 capsule (50 mg total) by mouth 2 (two) times daily. 60 capsule 1    rosuvastatin (CRESTOR) 40 MG Tab Take 10 mg by mouth every evening.      sertraline (ZOLOFT) 50 MG tablet Take 50 mg by mouth once daily.      zolpidem (AMBIEN) 10 mg Tab Take 5 mg by mouth nightly as needed.          PMHx, PSHx, Allergies, Medications reviewed in epic    ROS negative except pain complaints in HPI    OBJECTIVE:    /81 (BP Location: Right arm, Patient Position: Sitting)   Pulse 90   Temp 96.9 °F (36.1 °C) (Temporal)   Resp 20   Ht 5' 6" (1.676 m)   Wt 81.1 kg (178 lb 12.7 oz)   SpO2 95%   Breastfeeding No   BMI 28.86 kg/m²     PHYSICAL EXAMINATION:    GENERAL: Well appearing, in no acute distress, alert and oriented x3.  PSYCH:  Mood and affect appropriate.  SKIN: Skin color, texture, turgor normal, no rashes or lesions which will impact the procedure.  CV: RRR with palpation of the radial artery.  PULM: No evidence of respiratory difficulty, symmetric chest rise. Clear to " auscultation.  NEURO: Cranial nerves grossly intact.    Plan:    Proceed with procedure as planned Procedure(s) (LRB):  C6/7 IL JOJO (N/A)    Fish Claudio MD  02/21/2024

## 2024-02-21 NOTE — DISCHARGE SUMMARY
Discharge Note  Short Stay      SUMMARY     Admit Date: 2/21/2024    Attending Physician: Fish Claudio MD        Discharge Physician: Fish Claudio MD        Discharge Date: 2/21/2024 7:59 AM    Procedure(s) (LRB):  C6/7 IL JOJO (N/A)    Final Diagnosis: Cervical radiculopathy [M54.12]    Disposition: Home or self care    Patient Instructions:   Current Discharge Medication List        CONTINUE these medications which have NOT CHANGED    Details   glimepiride (AMARYL) 1 MG tablet Take 1 mg by mouth before breakfast.      icosapent ethyL (VASCEPA) 1 gram Cap Take 2 g by mouth 2 (two) times daily.      irbesartan (AVAPRO) 75 MG tablet Take 75 mg by mouth every evening.      pregabalin (LYRICA) 50 MG capsule Take 1 capsule (50 mg total) by mouth 2 (two) times daily.  Qty: 60 capsule, Refills: 1    Comments: Take 1 pill at night for 5 days.  Then take 1 pill at night and 1 pill in the morning.  Associated Diagnoses: Cervical stenosis of spinal canal; Cervical radiculopathy; Cervical disc disease; Cervical spondylosis      rosuvastatin (CRESTOR) 40 MG Tab Take 10 mg by mouth every evening.      sertraline (ZOLOFT) 50 MG tablet Take 50 mg by mouth once daily.      zolpidem (AMBIEN) 10 mg Tab Take 5 mg by mouth nightly as needed.                 Discharge Diagnosis: Cervical radiculopathy [M54.12]  Condition on Discharge: Stable with no complications to procedure   Diet on Discharge: Same as before.  Activity: as per instruction sheet.  Discharge to: Home with a responsible adult.  Follow up: 2-4 weeks       Please call the office at [phone] if you experience any weakness or loss of sensation, fever > 101.5, pain uncontrolled with oral medications, persistent nausea/vomiting/or diarrhea, redness or drainage from the incisions, or any other worrisome concerns. If physician on call was not reached or could not communicate with our office for any reason please go to the nearest emergency department

## 2024-02-21 NOTE — OP NOTE
Cervical Interlaminar Epidural Steroid Injection under Fluoroscopic Guidance.     INFORMED CONSENT: The procedure, risks, benefits and options were discussed with patient. There are no contraindications to the procedure. The patient expressed understanding and agreed to proceed. The personnel performing the procedure was discussed.    Date of procedure 02/21/2024    Time-out taken to identify patient and procedure side prior to starting the procedure.                     PROCEDURE: Cervical Interlaminar epidural steroid injection C6/C7 under fluoroscopic guidance.     Pre Procedure diagnosis:    Cervical radiculopathy [M54.12]  1. Cervical radiculopathy        Post-Procedure diagnosis:   same      PHYSICIAN: Fish Claudio MD    ASSISTANTS: None     MEDICATIONS INJECTED: 1ml Decadron PF 10mg/ml and 2ml  Lidocaine 1%    LOCAL ANESTHETIC INJECTED: 3ml  Lidocaine 1%     ESTIMATED BLOOD LOSS: none.     COMPLICATIONS: none.     Sedation: Conscious sedation provided by M.D    SEDATION MEDICATIONS: local/IV sedation: Versed 2 mg and fentanyl 75 mcg IV.  Conscious sedation ordered by MD.  Patient reevaluated and sedation administered by MD and monitored by RN.  Total sedation time was less than 10 min.      Total sedation time was <10 min      TECHNIQUE: With the patient laying in a prone position, the area was prepped and draped in the usual sterile fashion using ChloraPrep and a fenestrated drape. Local anesthetic was given using a 27-gauge needle by raising a wheal and going down to the hub of the needle over the C6/C7 interlaminar space.  The interlaminar space was then approached with a 3.5 inch 18-gauge Touhy needle was introduced under fluoroscopic guidance in the AP and Lateral view. Once the Ligamentum flavum was encountered loss of resistance to saline was used to enter the epidural space. With positive loss of resistance and negative CSF or Blood, 2mL contrast dye Omnipaque (300mg/ml) was injected to confirm  placement and there was no vascular runoff. The medication was then injected slowly. The patient tolerated the procedure well.         The patient was monitored for approximately 30 minutes after the procedure.  Patient was given post procedure and discharge instructions to follow at home.  The patient was discharged in a stable condition

## 2024-03-06 ENCOUNTER — TELEPHONE (OUTPATIENT)
Dept: NEUROSURGERY | Facility: CLINIC | Age: 64
End: 2024-03-06
Payer: MEDICARE

## 2024-03-06 NOTE — TELEPHONE ENCOUNTER
----- Message from Mera Vallejo sent at 3/6/2024  2:40 PM CST -----  Contact: Elina  Type:  Sooner Apoointment Request    Caller is requesting a sooner appointment. Caller will not accept being placed on the waitlist and is requesting a message be sent to doctor.  Name of Caller:Elina  When is the first available appointment?3/7/24  Symptoms:follow-up  Would the patient rather a call back or a response via [employer] [facility]? call  Best Call Back Number:[phone]   Additional Information: Patient request to reschedule visit. Please give patient a call back to assist.   Thank you,  GH

## 2024-03-06 NOTE — TELEPHONE ENCOUNTER
The patient has been rescheduled for an appointment due to Dr. Morales being out due to emergency surgery. The patient has been rescheduled for 04/18 at 1 PM. The patient confirmed appointment date and time. The patient verbalized understanding.
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
## 2024-03-21 ENCOUNTER — OFFICE VISIT (OUTPATIENT)
Dept: PAIN MEDICINE | Facility: CLINIC | Age: 64
End: 2024-03-21
Payer: COMMERCIAL

## 2024-03-21 VITALS
HEIGHT: 66 IN | DIASTOLIC BLOOD PRESSURE: 88 MMHG | WEIGHT: 181.88 LBS | SYSTOLIC BLOOD PRESSURE: 127 MMHG | RESPIRATION RATE: 17 BRPM | HEART RATE: 93 BPM | BODY MASS INDEX: 29.23 KG/M2

## 2024-03-21 DIAGNOSIS — M54.12 CERVICAL RADICULOPATHY: Primary | ICD-10-CM

## 2024-03-21 DIAGNOSIS — M50.90 CERVICAL DISC DISEASE: ICD-10-CM

## 2024-03-21 DIAGNOSIS — M47.812 CERVICAL SPONDYLOSIS: ICD-10-CM

## 2024-03-21 PROCEDURE — 4010F ACE/ARB THERAPY RXD/TAKEN: CPT | Mod: CPTII,S$GLB,, | Performed by: NURSE PRACTITIONER

## 2024-03-21 PROCEDURE — 3061F NEG MICROALBUMINURIA REV: CPT | Mod: CPTII,S$GLB,, | Performed by: NURSE PRACTITIONER

## 2024-03-21 PROCEDURE — 3008F BODY MASS INDEX DOCD: CPT | Mod: CPTII,S$GLB,, | Performed by: NURSE PRACTITIONER

## 2024-03-21 PROCEDURE — 3066F NEPHROPATHY DOC TX: CPT | Mod: CPTII,S$GLB,, | Performed by: NURSE PRACTITIONER

## 2024-03-21 PROCEDURE — 99999 PR PBB SHADOW E&M-EST. PATIENT-LVL III: CPT | Mod: PBBFAC,,, | Performed by: NURSE PRACTITIONER

## 2024-03-21 PROCEDURE — 3079F DIAST BP 80-89 MM HG: CPT | Mod: CPTII,S$GLB,, | Performed by: NURSE PRACTITIONER

## 2024-03-21 PROCEDURE — 3074F SYST BP LT 130 MM HG: CPT | Mod: CPTII,S$GLB,, | Performed by: NURSE PRACTITIONER

## 2024-03-21 PROCEDURE — 1159F MED LIST DOCD IN RCRD: CPT | Mod: CPTII,S$GLB,, | Performed by: NURSE PRACTITIONER

## 2024-03-21 PROCEDURE — 99213 OFFICE O/P EST LOW 20 MIN: CPT | Mod: S$GLB,,, | Performed by: NURSE PRACTITIONER

## 2024-03-21 RX ORDER — GABAPENTIN 300 MG/1
600 CAPSULE ORAL 2 TIMES DAILY
COMMUNITY

## 2024-04-18 ENCOUNTER — TELEPHONE (OUTPATIENT)
Dept: NEUROSURGERY | Facility: CLINIC | Age: 64
End: 2024-04-18

## 2024-04-18 ENCOUNTER — OFFICE VISIT (OUTPATIENT)
Dept: NEUROSURGERY | Facility: CLINIC | Age: 64
End: 2024-04-18
Payer: COMMERCIAL

## 2024-04-18 VITALS
BODY MASS INDEX: 28.77 KG/M2 | WEIGHT: 179 LBS | HEART RATE: 87 BPM | SYSTOLIC BLOOD PRESSURE: 129 MMHG | DIASTOLIC BLOOD PRESSURE: 77 MMHG | HEIGHT: 66 IN

## 2024-04-18 DIAGNOSIS — M50.30 DEGENERATIVE DISC DISEASE, CERVICAL: Primary | ICD-10-CM

## 2024-04-18 DIAGNOSIS — M54.12 CERVICAL RADICULOPATHY: ICD-10-CM

## 2024-04-18 DIAGNOSIS — M41.50 SCOLIOSIS DUE TO DEGENERATIVE DISEASE OF SPINE IN ADULT PATIENT: ICD-10-CM

## 2024-04-18 PROCEDURE — 1159F MED LIST DOCD IN RCRD: CPT | Mod: CPTII,S$GLB,, | Performed by: NEUROLOGICAL SURGERY

## 2024-04-18 PROCEDURE — 3066F NEPHROPATHY DOC TX: CPT | Mod: CPTII,S$GLB,, | Performed by: NEUROLOGICAL SURGERY

## 2024-04-18 PROCEDURE — 3008F BODY MASS INDEX DOCD: CPT | Mod: CPTII,S$GLB,, | Performed by: NEUROLOGICAL SURGERY

## 2024-04-18 PROCEDURE — 4010F ACE/ARB THERAPY RXD/TAKEN: CPT | Mod: CPTII,S$GLB,, | Performed by: NEUROLOGICAL SURGERY

## 2024-04-18 PROCEDURE — 3061F NEG MICROALBUMINURIA REV: CPT | Mod: CPTII,S$GLB,, | Performed by: NEUROLOGICAL SURGERY

## 2024-04-18 PROCEDURE — 3078F DIAST BP <80 MM HG: CPT | Mod: CPTII,S$GLB,, | Performed by: NEUROLOGICAL SURGERY

## 2024-04-18 PROCEDURE — 3074F SYST BP LT 130 MM HG: CPT | Mod: CPTII,S$GLB,, | Performed by: NEUROLOGICAL SURGERY

## 2024-04-18 PROCEDURE — 99214 OFFICE O/P EST MOD 30 MIN: CPT | Mod: S$GLB,,, | Performed by: NEUROLOGICAL SURGERY

## 2024-04-18 PROCEDURE — 99999 PR PBB SHADOW E&M-EST. PATIENT-LVL III: CPT | Mod: PBBFAC,,, | Performed by: NEUROLOGICAL SURGERY

## 2024-04-18 PROCEDURE — 3044F HG A1C LEVEL LT 7.0%: CPT | Mod: CPTII,S$GLB,, | Performed by: NEUROLOGICAL SURGERY

## 2024-04-18 RX ORDER — TRAMADOL HYDROCHLORIDE 50 MG/1
50 TABLET ORAL EVERY 6 HOURS PRN
Qty: 60 TABLET | Refills: 0 | Status: SHIPPED | OUTPATIENT
Start: 2024-04-18

## 2024-04-18 NOTE — TELEPHONE ENCOUNTER
Spoke with Jose @ pharmacy gave clarification on medication. Informed him that provider is aware of patient medications.

## 2024-04-18 NOTE — PROGRESS NOTES
Subjective:      Patient ID: Elina Franco is a 63 y.o. female.    Chief Complaint: No chief complaint on file.    Patient is here today for follow-up for her cervical spine after having an interlaminar injection  She states that the injections did help for a little while however her symptoms returned  Rates her neck pain is 6/10 today  Symptoms distal to the biceps bilaterally    We went over her MRI as well as her x-rays of cervical spine    These were at the imaging Center [facility] and we have them accessed on the imaging portal      Review of Systems   Constitutional:  Negative for activity change, appetite change and chills.   HENT:  Negative for congestion and ear pain.    Eyes:  Negative for photophobia, redness and visual disturbance.   Respiratory:  Negative for apnea and chest tightness.    Cardiovascular:  Negative for chest pain.   Gastrointestinal:  Negative for abdominal distention and abdominal pain.   Endocrine: Negative for cold intolerance.   Genitourinary:  Negative for difficulty urinating.   Musculoskeletal:  Positive for myalgias, neck pain and neck stiffness. Negative for arthralgias.   Skin:  Negative for color change.   Allergic/Immunologic: Negative for environmental allergies.   Neurological:  Positive for weakness and numbness. Negative for dizziness.   Hematological:  Negative for adenopathy. Does not bruise/bleed easily.   Psychiatric/Behavioral:  Negative for agitation, behavioral problems and confusion.          Objective:       Physical Exam:  Nursing note and vitals reviewed.    Constitutional: She appears well-nourished. No distress.     Eyes: EOM are normal.     Cardiovascular: Normal rate.     Psych/Behavior: She is alert. She is oriented to person, place, and time. She has a normal mood and affect.     Musculoskeletal:        Neck: Range of motion is limited. There is tenderness. Muscle strength is 5/5.        Right Upper Extremities: There is no tenderness. Muscle strength  is 5/5.        Left Upper Extremities: There is no tenderness. Muscle strength is 5/5.     Neurological:        Sensory: There is no sensory deficit in the trunk. There is no sensory deficit in the extremities.        Cranial nerves: Cranial nerve(s) II, III, IV, V, VI, VII, VIII, IX, X, XI and XII are intact.     General    Nursing note and vitals reviewed.  Constitutional: She is oriented to person, place, and time. She appears well-nourished. No distress.   Eyes: EOM are normal.   Cardiovascular:  Normal rate.            Neurological: She is alert and oriented to person, place, and time.   Psychiatric: She has a normal mood and affect.             Ortho Exam      MRI cervical spine shows degenerative disc disease worse at C4-5 and C5-6 there is also evidence of a posterior osteophyte at C6-7 causing mild foraminal stenosis bilaterally at each of these levels  There is mention of this being worse from prior imaging however I do not have the prior imaging to compare to    I  reviewed all pertinent imaging regarding this case.  Assessment:     1. Degenerative disc disease, cervical    2. Cervical radiculopathy    3. Scoliosis due to degenerative disease of spine in adult patient      Plan:     Degenerative disc disease, cervical    Cervical radiculopathy    Scoliosis due to degenerative disease of spine in adult patient    Other orders  -     traMADoL (ULTRAM) 50 mg tablet; Take 1 tablet (50 mg total) by mouth every 6 (six) hours as needed for Pain.  Dispense: 60 tablet; Refill: 0      I have gone over the patient's imaging as well as the treatment options with her  She has persistent symptoms after physical therapy as well as injections with pain management  She has had an EMG which showed mild carpal tunnel without any acute cervical radiculopathy    She is wishing to pursue surgical intervention I recommend an anterior cervical diskectomy and fusion C4-5 C5-6 and C6-7    I have discussed the procedure in detail  with her  She states that this time she is currently expecting the birth of her grandchild in August and does not wish to consider surgical intervention until after that time    She will make follow-up appointment 2 months approximate before she wishes to proceed with signing surgical paperwork    She will have follow-up appointment through the portal    Her imaging will be pushed over into our PACS system  I provided her with a prescription for Ultram which she can take on an as-needed basis for      Thank you for the referral   Please call with any questions    Solis Morales MD  Neurosurgery     Disclaimer: This note was prepared using a voice recognition system and is likely to have sound alike errors within the text.      Thank you for the referral   Please call with any questions    Solis Morales MD  Neurosurgery     Disclaimer: This note was prepared using a voice recognition system and is likely to have sound alike errors within the text.

## 2024-04-18 NOTE — TELEPHONE ENCOUNTER
----- Message from Roseline Rousseau sent at 4/18/2024  2:04 PM CDT -----  Contact: Jose Garcia with [facility] is calling to speak with the nurse regardingtraMADoL (ULTRAM) 50 mg tablet  . Reports wanting to know if provider was aware of the patient taking Ambien from another doctor which is calling a interaction with feeling traMADoL (ULTRAM) 50 mg tablet . Please give patient a call back at

## 2024-05-22 ENCOUNTER — PATIENT MESSAGE (OUTPATIENT)
Dept: PAIN MEDICINE | Facility: CLINIC | Age: 64
End: 2024-05-22
Payer: MEDICARE

## 2024-05-23 ENCOUNTER — PATIENT MESSAGE (OUTPATIENT)
Dept: PAIN MEDICINE | Facility: CLINIC | Age: 64
End: 2024-05-23
Payer: MEDICARE

## 2024-05-23 ENCOUNTER — TELEPHONE (OUTPATIENT)
Dept: PAIN MEDICINE | Facility: CLINIC | Age: 64
End: 2024-05-23
Payer: MEDICARE

## 2024-05-23 DIAGNOSIS — M54.12 CERVICAL RADICULOPATHY: Primary | ICD-10-CM

## 2024-05-23 NOTE — TELEPHONE ENCOUNTER
Called to schedule pt procedure with Dr Claudio, pt was scheduled for June 12.    .Delia Carranza CCM

## 2024-05-23 NOTE — TELEPHONE ENCOUNTER
----- Message from Fish Claudio MD sent at 5/23/2024  9:09 AM CDT -----  Pain Provider: González  Patient Name: Elina Franco  MRN: [id number]  Case: CERVICAL INTERLAMINAR EPIDURAL STEROID INJECTION  Level: C6/7       Return to clinic 4 weeks after procedure

## 2024-05-30 ENCOUNTER — TELEPHONE (OUTPATIENT)
Dept: PAIN MEDICINE | Facility: CLINIC | Age: 64
End: 2024-05-30
Payer: MEDICARE

## 2024-05-30 NOTE — TELEPHONE ENCOUNTER
Spoke with patient for clarification for last C6/7 IL JOJO. She got 80% relief x 2 months, then 50% relief for an additional month with improvements in ADLs

## 2024-06-03 NOTE — PRE-PROCEDURE INSTRUCTIONS
Spoke with patient regarding procedure scheduled on 6.12     Arrival time 0700     Has patient been sick with fever or on antibiotics within the last 7 days? No     Does the patient have any open wounds, sores or rashes? No     Does the patient have any recent fractures? no     Has patient received a vaccination within the last 7 days? No     Received the COVID vaccination? yes     Has the patient stopped all medications as directed? na     Does patient have a pacemaker, defibrillator, or implantable stimulator? No     Does the patient have a ride to and from procedure and someone reliable to remain with patient?        Is the patient diabetic? es     Does the patient have sleep apnea? Or use O2 at home? griselda on cpap     Is the patient receiving sedation? yes     Is the patient instructed to remain NPO beginning at midnight the night before their procedure? yes     Procedure location confirmed with patient? Yes     Covid- Denies signs/symptoms. Instructed to notify PAT/MD if any changes.

## 2024-06-12 ENCOUNTER — HOSPITAL ENCOUNTER (OUTPATIENT)
Facility: HOSPITAL | Age: 64
Discharge: HOME | End: 2024-06-12
Attending: ANESTHESIOLOGY | Admitting: ANESTHESIOLOGY
Payer: MEDICARE

## 2024-06-12 VITALS
BODY MASS INDEX: 29.53 KG/M2 | DIASTOLIC BLOOD PRESSURE: 60 MMHG | SYSTOLIC BLOOD PRESSURE: 108 MMHG | HEIGHT: 66 IN | HEART RATE: 75 BPM | TEMPERATURE: 97 F | RESPIRATION RATE: 15 BRPM | OXYGEN SATURATION: 96 % | WEIGHT: 183.75 LBS

## 2024-06-12 DIAGNOSIS — M54.12 CERVICAL RADICULOPATHY: ICD-10-CM

## 2024-06-12 LAB — POCT GLUCOSE: 141 MG/DL (ref 70–110)

## 2024-06-12 PROCEDURE — 63600175 PHARM REV CODE 636 W HCPCS: Performed by: ANESTHESIOLOGY

## 2024-06-12 PROCEDURE — 25500020 PHARM REV CODE 255: Performed by: ANESTHESIOLOGY

## 2024-06-12 PROCEDURE — 62321 NJX INTERLAMINAR CRV/THRC: CPT | Mod: ,,, | Performed by: ANESTHESIOLOGY

## 2024-06-12 PROCEDURE — 62321 NJX INTERLAMINAR CRV/THRC: CPT | Performed by: ANESTHESIOLOGY

## 2024-06-12 PROCEDURE — 25000003 PHARM REV CODE 250: Performed by: ANESTHESIOLOGY

## 2024-06-12 PROCEDURE — 82962 GLUCOSE BLOOD TEST: CPT | Performed by: ANESTHESIOLOGY

## 2024-06-12 RX ORDER — DEXAMETHASONE SODIUM PHOSPHATE 10 MG/ML
INJECTION INTRAMUSCULAR; INTRAVENOUS
Status: DISCONTINUED | OUTPATIENT
Start: 2024-06-12 | End: 2024-06-12 | Stop reason: HOSPADM

## 2024-06-12 RX ORDER — ONDANSETRON HYDROCHLORIDE 2 MG/ML
4 INJECTION, SOLUTION INTRAVENOUS ONCE AS NEEDED
Status: DISCONTINUED | OUTPATIENT
Start: 2024-06-12 | End: 2024-06-12 | Stop reason: HOSPADM

## 2024-06-12 RX ORDER — MIDAZOLAM HYDROCHLORIDE 1 MG/ML
INJECTION, SOLUTION INTRAMUSCULAR; INTRAVENOUS
Status: DISCONTINUED | OUTPATIENT
Start: 2024-06-12 | End: 2024-06-12 | Stop reason: HOSPADM

## 2024-06-12 RX ORDER — LIDOCAINE HYDROCHLORIDE 10 MG/ML
INJECTION, SOLUTION EPIDURAL; INFILTRATION; INTRACAUDAL; PERINEURAL
Status: DISCONTINUED | OUTPATIENT
Start: 2024-06-12 | End: 2024-06-12 | Stop reason: HOSPADM

## 2024-06-12 RX ORDER — FENTANYL CITRATE 50 UG/ML
INJECTION, SOLUTION INTRAMUSCULAR; INTRAVENOUS
Status: DISCONTINUED | OUTPATIENT
Start: 2024-06-12 | End: 2024-06-12 | Stop reason: HOSPADM

## 2024-06-12 NOTE — DISCHARGE SUMMARY
Discharge Note  Short Stay      SUMMARY     Admit Date: 6/12/2024    Attending Physician: Fish Claudio MD        Discharge Physician: Fish Claudio MD        Discharge Date: 6/12/2024 8:23 AM    Procedure(s) (LRB):  C6/7 IL JOJO (N/A)    Final Diagnosis: Cervical radiculopathy [M54.12]    Disposition: Home or self care    Patient Instructions:   Current Discharge Medication List        CONTINUE these medications which have NOT CHANGED    Details   glimepiride (AMARYL) 1 MG tablet Take 1 mg by mouth before breakfast.      irbesartan (AVAPRO) 75 MG tablet Take 75 mg by mouth every evening.      gabapentin (NEURONTIN) 300 MG capsule Take 600 mg by mouth 2 (two) times daily.      icosapent ethyL (VASCEPA) 1 gram Cap Take 2 g by mouth 2 (two) times daily.      pregabalin (LYRICA) 50 MG capsule Take 1 capsule (50 mg total) by mouth 2 (two) times daily.  Qty: 60 capsule, Refills: 1    Comments: Take 1 pill at night for 5 days.  Then take 1 pill at night and 1 pill in the morning.  Associated Diagnoses: Cervical stenosis of spinal canal; Cervical radiculopathy; Cervical disc disease; Cervical spondylosis      rosuvastatin (CRESTOR) 40 MG Tab Take 10 mg by mouth every evening.      sertraline (ZOLOFT) 50 MG tablet Take 50 mg by mouth once daily.      traMADoL (ULTRAM) 50 mg tablet Take 1 tablet (50 mg total) by mouth every 6 (six) hours as needed for Pain.  Qty: 60 tablet, Refills: 0    Comments: Quantity prescribed more than 7 day supply? Yes, quantity medically necessary      zolpidem (AMBIEN) 10 mg Tab Take 5 mg by mouth nightly as needed.                 Discharge Diagnosis: Cervical radiculopathy [M54.12]  Condition on Discharge: Stable with no complications to procedure   Diet on Discharge: Same as before.  Activity: as per instruction sheet.  Discharge to: Home with a responsible adult.  Follow up: 2-4 weeks       Please call the office at [phone] if you experience any weakness or loss of sensation,  fever > 101.5, pain uncontrolled with oral medications, persistent nausea/vomiting/or diarrhea, redness or drainage from the incisions, or any other worrisome concerns. If physician on call was not reached or could not communicate with our office for any reason please go to the nearest emergency department

## 2024-06-12 NOTE — DISCHARGE INSTRUCTIONS

## 2024-06-12 NOTE — OP NOTE
Cervical Interlaminar Epidural Steroid Injection under Fluoroscopic Guidance.     INFORMED CONSENT: The procedure, risks, benefits and options were discussed with patient. There are no contraindications to the procedure. The patient expressed understanding and agreed to proceed. The personnel performing the procedure was discussed.    Date of procedure 06/12/2024    Time-out taken to identify patient and procedure side prior to starting the procedure.                     PROCEDURE: Cervical Interlaminar epidural steroid injection C6/C7 under fluoroscopic guidance.     Pre Procedure diagnosis:    Cervical radiculopathy [M54.12]  1. Cervical radiculopathy        Post-Procedure diagnosis:   same      PHYSICIAN: Fish Claudio MD    ASSISTANTS: None     MEDICATIONS INJECTED: 1ml Decadron PF 10mg/ml and 2ml  Lidocaine 1%    LOCAL ANESTHETIC INJECTED: 3ml  Lidocaine 1%     ESTIMATED BLOOD LOSS: none.     COMPLICATIONS: none.     Sedation: Conscious sedation provided by M.D    SEDATION MEDICATIONS: local/IV sedation: Versed 2 mg and fentanyl 75 mcg IV.  Conscious sedation ordered by MD.  Patient reevaluated and sedation administered by MD and monitored by RN.  Total sedation time was less than 10 min.      Total sedation time was <10 min      TECHNIQUE: With the patient laying in a prone position, the area was prepped and draped in the usual sterile fashion using ChloraPrep and a fenestrated drape. Local anesthetic was given using a 27-gauge needle by raising a wheal and going down to the hub of the needle over the C6/C7 interlaminar space.  The interlaminar space was then approached with a 3.5 inch 18-gauge Touhy needle was introduced under fluoroscopic guidance in the AP and Lateral view. Once the Ligamentum flavum was encountered loss of resistance to saline was used to enter the epidural space. With positive loss of resistance and negative CSF or Blood, 2mL contrast dye Omnipaque (300mg/ml) was injected to confirm  placement and there was no vascular runoff. The medication was then injected slowly. The patient tolerated the procedure well.         The patient was monitored for approximately 30 minutes after the procedure.  Patient was given post procedure and discharge instructions to follow at home.  The patient was discharged in a stable condition

## 2024-06-12 NOTE — H&P
"HPI  Patient presenting for Procedure(s) (LRB):  C6/7 IL JOJO (N/A)     Patient on Anti-coagulation No    No health changes since previous encounter    Past Medical History:   Diagnosis Date    Hypertension      Past Surgical History:   Procedure Laterality Date    EPIDURAL STEROID INJECTION INTO CERVICAL SPINE N/A 2/21/2024    Procedure: C6/7 IL JOJO;  Surgeon: Fish Claudio MD;  [facility];  Service: Pain Management;  Laterality: N/A;    HYSTERECTOMY       Review of patient's allergies indicates:   Allergen Reactions    Statins-hmg-coa reductase inhibitors Swelling and Other (See Comments)     Lower extremity cramp with pravastatin in 2016.    Sulfa (sulfonamide antibiotics)         No current facility-administered medications on file prior to encounter.     Current Outpatient Medications on File Prior to Encounter   Medication Sig Dispense Refill    glimepiride (AMARYL) 1 MG tablet Take 1 mg by mouth before breakfast.      irbesartan (AVAPRO) 75 MG tablet Take 75 mg by mouth every evening.      gabapentin (NEURONTIN) 300 MG capsule Take 600 mg by mouth 2 (two) times daily.      icosapent ethyL (VASCEPA) 1 gram Cap Take 2 g by mouth 2 (two) times daily.      pregabalin (LYRICA) 50 MG capsule Take 1 capsule (50 mg total) by mouth 2 (two) times daily. (Patient not taking: Reported on 3/21/2024) 60 capsule 1    rosuvastatin (CRESTOR) 40 MG Tab Take 10 mg by mouth every evening.      sertraline (ZOLOFT) 50 MG tablet Take 50 mg by mouth once daily.      traMADoL (ULTRAM) 50 mg tablet Take 1 tablet (50 mg total) by mouth every 6 (six) hours as needed for Pain. 60 tablet 0    zolpidem (AMBIEN) 10 mg Tab Take 5 mg by mouth nightly as needed.          PMHx, PSHx, Allergies, Medications reviewed in epic    ROS negative except pain complaints in HPI    OBJECTIVE:    /65 (BP Location: Right arm, Patient Position: Sitting)   Pulse 86   Temp 97.2 °F (36.2 °C)   Resp 16   Ht 5' 6" (1.676 m)   Wt 83.3 " kg (183 lb 12.1 oz)   SpO2 96%   Breastfeeding No   BMI 29.66 kg/m²     PHYSICAL EXAMINATION:    GENERAL: Well appearing, in no acute distress, alert and oriented x3.  PSYCH:  Mood and affect appropriate.  SKIN: Skin color, texture, turgor normal, no rashes or lesions which will impact the procedure.  CV: RRR with palpation of the radial artery.  PULM: No evidence of respiratory difficulty, symmetric chest rise. Clear to auscultation.  NEURO: Cranial nerves grossly intact.    Plan:    Proceed with procedure as planned Procedure(s) (LRB):  C6/7 IL JOJO (N/A)    Fish Claudio MD  06/12/2024

## 2024-07-16 ENCOUNTER — TELEPHONE (OUTPATIENT)
Dept: PAIN MEDICINE | Facility: CLINIC | Age: 64
End: 2024-07-16
Payer: MEDICARE

## 2024-07-31 ENCOUNTER — PATIENT MESSAGE (OUTPATIENT)
Dept: NEUROSURGERY | Facility: CLINIC | Age: 64
End: 2024-07-31
Payer: MEDICARE

## 2024-08-06 ENCOUNTER — PATIENT MESSAGE (OUTPATIENT)
Dept: NEUROSURGERY | Facility: CLINIC | Age: 64
End: 2024-08-06
Payer: MEDICARE

## 2024-09-17 ENCOUNTER — TELEPHONE (OUTPATIENT)
Dept: NEUROSURGERY | Facility: CLINIC | Age: 64
End: 2024-09-17
Payer: MEDICARE

## 2024-09-19 ENCOUNTER — OFFICE VISIT (OUTPATIENT)
Dept: NEUROSURGERY | Facility: CLINIC | Age: 64
End: 2024-09-19
Payer: MEDICARE

## 2024-09-19 VITALS
SYSTOLIC BLOOD PRESSURE: 130 MMHG | WEIGHT: 163.13 LBS | BODY MASS INDEX: 26.33 KG/M2 | HEART RATE: 78 BPM | DIASTOLIC BLOOD PRESSURE: 82 MMHG

## 2024-09-19 DIAGNOSIS — M47.892 OTHER SPONDYLOSIS, CERVICAL REGION: Primary | ICD-10-CM

## 2024-09-19 DIAGNOSIS — M50.30 DEGENERATIVE DISC DISEASE, CERVICAL: ICD-10-CM

## 2024-09-19 PROCEDURE — 99214 OFFICE O/P EST MOD 30 MIN: CPT | Mod: S$PBB,,, | Performed by: NEUROLOGICAL SURGERY

## 2024-09-19 PROCEDURE — 99213 OFFICE O/P EST LOW 20 MIN: CPT | Mod: PBBFAC | Performed by: NEUROLOGICAL SURGERY

## 2024-09-19 PROCEDURE — 99999 PR PBB SHADOW E&M-EST. PATIENT-LVL III: CPT | Mod: PBBFAC,,, | Performed by: NEUROLOGICAL SURGERY

## 2024-09-19 NOTE — PROGRESS NOTES
Subjective:      Patient ID: Elina Franco is a 64 y.o. female.    Chief Complaint: Degenerative disc disease, cervical (Pt reports to discuss surgical intervention )     Patient here with follow-up with a CT and MRI of the cervical spine review   Neck pain as well as bilateral upper extremity numbness and tingling   Neck pain is worsening upper extremity symptoms   Worse with activity better with rest   She is failed injections without any relief of symptoms   Medications have not alleviated pain       Previous notes  Patient is here today for follow-up for her cervical spine after having an interlaminar injection  She states that the injections did help for a little while however her symptoms returned  Rates her neck pain is 6/10 today  Symptoms distal to the biceps bilaterally    We went over her MRI as well as her x-rays of cervical spine    These were at the imaging Center [facility] and we have them accessed on the imaging portal      Review of Systems   Constitutional:  Negative for activity change, appetite change and chills.   HENT:  Negative for congestion and ear pain.    Eyes:  Negative for photophobia, redness and visual disturbance.   Respiratory:  Negative for apnea and chest tightness.    Cardiovascular:  Negative for chest pain.   Gastrointestinal:  Negative for abdominal distention and abdominal pain.   Endocrine: Negative for cold intolerance.   Genitourinary:  Negative for difficulty urinating.   Musculoskeletal:  Positive for myalgias, neck pain and neck stiffness. Negative for arthralgias.   Skin:  Negative for color change.   Allergic/Immunologic: Negative for environmental allergies.   Neurological:  Positive for weakness and numbness. Negative for dizziness.   Hematological:  Negative for adenopathy. Does not bruise/bleed easily.   Psychiatric/Behavioral:  Negative for agitation, behavioral problems and confusion.          Objective:       Physical Exam:  Nursing note and vitals  reviewed.    Constitutional: She appears well-nourished. No distress.     Eyes: EOM are normal.     Cardiovascular: Normal rate.     Psych/Behavior: She is alert. She is oriented to person, place, and time. She has a normal mood and affect.     Musculoskeletal:        Neck: Range of motion is limited. There is tenderness. Muscle strength is 5/5.        Right Upper Extremities: There is no tenderness. Muscle strength is 5/5.        Left Upper Extremities: There is no tenderness. Muscle strength is 5/5.     Neurological:        Sensory: There is no sensory deficit in the trunk. There is no sensory deficit in the extremities.        Cranial nerves: Cranial nerve(s) II, III, IV, V, VI, VII, VIII, IX, X, XI and XII are intact.     General    Nursing note and vitals reviewed.  Constitutional: She is oriented to person, place, and time. She appears well-nourished. No distress.   Eyes: EOM are normal.   Cardiovascular:  Normal rate.            Neurological: She is alert and oriented to person, place, and time.   Psychiatric: She has a normal mood and affect.             Ortho Exam      CT cervical spine  C4-5: Posterior disc osteophyte complex effaces the anterior thecal sac with at least mild spinal canal stenosis.  Facet and uncovertebral degenerative findings more prevalent on the right causing moderate to severe left and severe right neural foraminal narrowing.     C5-6: Similar posterior disc osteophyte complex at C4-5 level with mild-to-moderate spinal canal stenosis.  Facet and uncovertebral degenerative findings result in left mild-to-moderate and right mild neural foraminal narrowing.  MRI cervical spine shows degenerative disc disease worse at C4-5 and C5-6 there is also evidence of a posterior osteophyte at C6-7 causing mild foraminal stenosis bilaterally at each of these levels  There is mention of this being worse from prior imaging however I do not have the prior imaging to compare to    I  reviewed all  pertinent imaging regarding this case.  Assessment:     1. Other spondylosis, cervical region    2. Degenerative disc disease, cervical        Plan:     Other spondylosis, cervical region  -     CT Cervical Spine Without Contrast; Future; Expected date: 09/19/2024    Degenerative disc disease, cervical  -     CT Cervical Spine Without Contrast; Future; Expected date: 09/19/2024     Patient with posterior neck and upper extremity symptoms   MRI and CT scan showing degenerative disc disease and varying degrees of central and foraminal stenosis C4-5 and C5-6     Based on the imaging and symptoms I have recommended anterior cervical diskectomy and fusion C4-5 and C5-6    The patient was informed of all benefits and potential risk of the operation including but not limited to:  Pain, infection, bleeding, coma, paralysis, death.  Cerebrospinal fluid leak, failure of any instrumentation, the need for additional procedures in the future. No guarantee was given that this procedure would alleviate all of the symptoms.    Consents signed      Will call with the  surgical date in the near future    Thank you for the referral   Please call with any questions    Solis Morales MD  Neurosurgery     Disclaimer: This note was prepared using a voice recognition system and is likely to have sound alike errors within the text.      Thank you for the referral   Please call with any questions    Solis Morales MD  Neurosurgery     Disclaimer: This note was prepared using a voice recognition system and is likely to have sound alike errors within the text.

## 2024-09-24 DIAGNOSIS — Z01.818 PRE-OP TESTING: Primary | ICD-10-CM

## 2024-09-24 DIAGNOSIS — M54.12 CERVICAL RADICULOPATHY: ICD-10-CM

## 2024-09-24 DIAGNOSIS — M48.02 CERVICAL STENOSIS OF SPINAL CANAL: ICD-10-CM

## 2024-09-24 DIAGNOSIS — R79.1 ABNORMAL BLOOD COAGULATION PROFILE: ICD-10-CM

## 2024-09-25 ENCOUNTER — PATIENT MESSAGE (OUTPATIENT)
Dept: NEUROSURGERY | Facility: CLINIC | Age: 64
End: 2024-09-25
Payer: MEDICARE

## 2024-09-25 DIAGNOSIS — Z01.818 PRE-OP TESTING: Primary | ICD-10-CM

## 2024-09-27 ENCOUNTER — HOSPITAL ENCOUNTER (OUTPATIENT)
Dept: CARDIOLOGY | Facility: HOSPITAL | Age: 64
Discharge: HOME | End: 2024-09-27
Attending: NEUROLOGICAL SURGERY
Payer: MEDICARE

## 2024-09-27 ENCOUNTER — HOSPITAL ENCOUNTER (OUTPATIENT)
Dept: RADIOLOGY | Facility: HOSPITAL | Age: 64
Discharge: HOME | End: 2024-09-27
Attending: NEUROLOGICAL SURGERY
Payer: MEDICARE

## 2024-09-27 DIAGNOSIS — Z01.818 PRE-OP TESTING: ICD-10-CM

## 2024-09-27 DIAGNOSIS — M47.892 OTHER SPONDYLOSIS, CERVICAL REGION: ICD-10-CM

## 2024-09-27 DIAGNOSIS — M50.30 DEGENERATIVE DISC DISEASE, CERVICAL: ICD-10-CM

## 2024-09-27 LAB
OHS QRS DURATION: 68 MS
OHS QTC CALCULATION: 448 MS

## 2024-09-27 PROCEDURE — 93005 ELECTROCARDIOGRAM TRACING: CPT

## 2024-09-27 PROCEDURE — 71046 X-RAY EXAM CHEST 2 VIEWS: CPT | Mod: TC

## 2024-09-27 PROCEDURE — 72125 CT NECK SPINE W/O DYE: CPT | Mod: 26,,, | Performed by: RADIOLOGY

## 2024-09-27 PROCEDURE — 72125 CT NECK SPINE W/O DYE: CPT | Mod: TC

## 2024-09-27 PROCEDURE — 93010 ELECTROCARDIOGRAM REPORT: CPT | Mod: ,,, | Performed by: INTERNAL MEDICINE

## 2024-09-27 PROCEDURE — 71046 X-RAY EXAM CHEST 2 VIEWS: CPT | Mod: 26,,, | Performed by: RADIOLOGY

## 2024-09-30 ENCOUNTER — PATIENT MESSAGE (OUTPATIENT)
Dept: PREADMISSION TESTING | Facility: HOSPITAL | Age: 64
End: 2024-09-30
Payer: MEDICARE

## 2024-09-30 ENCOUNTER — OFFICE VISIT (OUTPATIENT)
Dept: INTERNAL MEDICINE | Facility: CLINIC | Age: 64
End: 2024-09-30
Payer: MEDICARE

## 2024-09-30 VITALS
OXYGEN SATURATION: 97 % | SYSTOLIC BLOOD PRESSURE: 107 MMHG | RESPIRATION RATE: 17 BRPM | HEART RATE: 93 BPM | DIASTOLIC BLOOD PRESSURE: 58 MMHG | TEMPERATURE: 97 F

## 2024-09-30 DIAGNOSIS — Z98.890 PONV (POSTOPERATIVE NAUSEA AND VOMITING): ICD-10-CM

## 2024-09-30 DIAGNOSIS — M54.12 CERVICAL RADICULOPATHY: ICD-10-CM

## 2024-09-30 DIAGNOSIS — K21.9 GASTROESOPHAGEAL REFLUX DISEASE, UNSPECIFIED WHETHER ESOPHAGITIS PRESENT: ICD-10-CM

## 2024-09-30 DIAGNOSIS — R11.2 PONV (POSTOPERATIVE NAUSEA AND VOMITING): ICD-10-CM

## 2024-09-30 DIAGNOSIS — N18.2 CHRONIC KIDNEY DISEASE, STAGE 2 (MILD): ICD-10-CM

## 2024-09-30 DIAGNOSIS — F41.9 ANXIETY: ICD-10-CM

## 2024-09-30 DIAGNOSIS — E78.2 MIXED HYPERLIPIDEMIA: ICD-10-CM

## 2024-09-30 DIAGNOSIS — I15.1 HYPERTENSION SECONDARY TO OTHER RENAL DISORDERS: ICD-10-CM

## 2024-09-30 DIAGNOSIS — R73.01 IMPAIRED FASTING GLUCOSE: ICD-10-CM

## 2024-09-30 DIAGNOSIS — F51.04 PSYCHOPHYSIOLOGICAL INSOMNIA: ICD-10-CM

## 2024-09-30 DIAGNOSIS — G47.33 OSA (OBSTRUCTIVE SLEEP APNEA): ICD-10-CM

## 2024-09-30 DIAGNOSIS — M15.0 PRIMARY OSTEOARTHRITIS INVOLVING MULTIPLE JOINTS: ICD-10-CM

## 2024-09-30 DIAGNOSIS — Z01.818 PRE-OP TESTING: ICD-10-CM

## 2024-09-30 DIAGNOSIS — M48.02 CERVICAL STENOSIS OF SPINAL CANAL: Primary | ICD-10-CM

## 2024-09-30 PROCEDURE — 99213 OFFICE O/P EST LOW 20 MIN: CPT | Mod: PBBFAC

## 2024-09-30 PROCEDURE — 99999 PR PBB SHADOW E&M-EST. PATIENT-LVL III: CPT | Mod: PBBFAC,,,

## 2024-09-30 RX ORDER — TIRZEPATIDE 7.5 MG/.5ML
7.5 INJECTION, SOLUTION SUBCUTANEOUS
COMMUNITY

## 2024-09-30 RX ORDER — PANTOPRAZOLE SODIUM 40 MG/1
40 TABLET, DELAYED RELEASE ORAL DAILY
COMMUNITY

## 2024-09-30 NOTE — ASSESSMENT & PLAN NOTE
FUSION, SPINE, CERVICAL, ANTERIOR APPROACH (C4-C7) planned per Dr. Morales on 10/7/2024     Known risk factors for perioperative complications: None    Difficulty with intubation is not anticipated.    Cardiac Risk Estimation: Based on the Revised Cardiac Risk index, patient is a Class I risk with a 3.9 % risk of a major cardiac event in a low risk procedure.    1.) Preoperative workup as follows: chest x-ray, ECG, hemoglobin, hematocrit, electrolytes, creatinine, glucose, liver function studies.  2.) Change in medication regimen before surgery: discontinue ASA 6 days before surgery, discontinue NSAIDs 5 days before surgery, hold Metformin 24 hours prior to surgery. Hold all vitamins/supplements for 7 days prior to surgery, with the exception of Potassium and Iron supplementation. Hold semaglutide/tirzepatide for 7 days prior to surgery.   3.) Prophylaxis for cardiac events with perioperative beta-blockers: not indicated.  4.) Invasive hemodynamic monitoring perioperatively: at the discretion of anesthesiologist.  5.) Deep vein thrombosis prophylaxis postoperatively: regimen to be chosen by surgical team.  6.) Surveillance for postoperative MI with ECG immediately postoperatively and on postoperati ve days 1 and 2 AND troponin levels 24 hours postoperatively and on day 4 or hospital discharge (whichever comes first): at the discretion of anesthesiologist.  7.) Current medications which may produce withdrawal symptoms if withheld perioperatively: None   8.) Other measures: Postoperative hypertension management with IV hydralazine until able to take oral medications.  Postoperative incentive spirometry to prevent pneumonia.  -please take Sertaline and Protonix morning of surgery

## 2024-09-30 NOTE — DISCHARGE INSTRUCTIONS
Pre op instructions reviewed with patient face to face during Clinic Visit with Provider, verbalized understanding.    To confirm, Surgery is scheduled on 10/07/24. We will call you after 2pm the day before Surgery with your arrival time.    *Please report to the [facility]obby (1st Floor) located off of [facility] (2nd Entrance/Building on the left, in front of the flag pole).  Address: [address] [place] [address] [place]. [address]      !!!INSTRUCTIONS IMPORTANT!!!  DO NOT Eat, Drink, or Smoke after 12 midnight unless instructed otherwise by your Surgeon. OK to brush teeth, no gum, candy or mints!    MORNING OF SURGERY, drink small sip of water with the following medications instructed by Surgery Pre-Admit Provider:  Sertraline, protonix    Diabetic/Prediabetic Patients: If you take diabetic or weight loss medication, Do NOT take morning of surgery unless instructed by Doctor. Metformin to be stopped 24 hrs prior to surgery. Ozempic/ Mounjaro/ Wegovy/ Trulicity/ Semaglutide or any weight loss injections to be stopped 7 days prior to surgery. DO NOT take long-acting insulin the evening before surgery. Blood sugars will be checked in pre-op by Nurse.    !!!STOP ALL Aspirins, NSAIDS, WEIGHT LOSS INJECTIONS/PILLS, Herbal supplements, & Vitamins 7 DAYS BEFORE SURGERY!!!    ____  Avoid Alcoholic beverages 3 days prior to surgery, as it can thin the blood.  ____  NO Acrylic/fake nails or nail polish worn day of surgery (specifically hand/arm & foot surgeries).  ____  NO powder, lotions, deodorants, oils or cream on body.  ____  Remove all jewelry, piercings, & foreign objects prior to arrival and leave at home.  ____  Remove Dentures, Hearing Aids & Contact Lens prior to surgery.  ____  Bring photo ID and insurance information to hospital (Leave Valuables at Home).  ____  If going home the same day, arrange for a ride home. You will not be able to drive for 24 hrs if Anesthesia was used.   ____  Females (ages  11-60): may need to give a urine sample the morning of surgery; please see Pre op Nurse prior to using the restroom.  ____  Males: Stop ED medications (Viagra, Cialis) 24 hrs prior to surgery.  ____  Wear clean, loose fitting clothing to allow for dressings/ bandages.      Bathing Instructions:    -Shower with anti-bacterial Soap (Hibiclens or Dial) the night before surgery & the morning of surgery!   -Do not use Hibiclens soap on your face or genitals.   -Apply clean clothes after shower.  -Do not shave your face or body 2 days prior to surgery unless instructed otherwise by your Surgeon.  -Do not shave pubic hair 7 days prior to surgery (gyn pt's).    [facility] Vis[facility]/Ride Policy:  Only 2 adults allowed in pre op/recovery area during your procedure. You MUST HAVE A RIDE HOME from a responsible adult that you know and trust. Medical Transport, Uber or Lyft can ONLY be used if patient has a responsible adult to accompany them during ride home.       *Signs and symptoms of Infection Before or After Surgery:               !!!If you experience any fever, chills, nausea/ vomiting, foul odor/ excessive drainage from surgical site, flu-like symptoms, new wounds or cuts, PLEASE CALL THE SURGEON OFFICE at [phone] or SEND MESSAGE THROUGH [employer] PORTAL!!!       *If you are running late day of surgery, please call the Surgery Dept @ [phone].    *Billing question, please call:  [phone] or [phone]       Thank you,  -[facility] Pre Admit Dept.  [phone] or [phone]  M-F 7:30 am-4:00 pm (Closed Major Holidays)

## 2024-09-30 NOTE — PROGRESS NOTES
Preoperative History and Physical  [facility]                                                                   Chief Complaint: Preoperative evaluation     History of Present Illness:      Elina Franco is a 64 y.o. female who presents to the office today for a preoperative consultation at the request of Dr. Morales who plans on performing a FUSION, SPINE, CERVICAL, ANTERIOR APPROACH (C4-C7) on October 7.     Functional Status:      The patient is able to climb a flight of stairs. The patient is able to ambulate independently without difficulty. The patient's functional status is not affected by the surgical problem. The patient's functional status is not affected by shortness of breath, chest pain, dyspnea on exertion and fatigue.    MET score greater than 4    Patient Anesthesia History:      History of Malignant Hyperthermia: no  History of Pseudocholinesterase Deficiency: no  History PONV: yes  History of difficult intubation: no  History of delayed emergence: no  History of high fever after anesthesia: no    Family Anesthesia History:      History of Malignant Hyperthermia: no  History of Pseudocholinesterase Deficiency: no    Past Medical History:      Past Medical History:   Diagnosis Date    Anxiety     Hyperlipidemia     Hypertension         Past Surgical History:      Past Surgical History:   Procedure Laterality Date    BUNIONECTOMY Left 2018    L great toe and side bunions    CARPAL TUNNEL RELEASE Bilateral     2000    CHOLECYSTECTOMY  2006    EPIDURAL STEROID INJECTION INTO CERVICAL SPINE N/A 02/21/2024    Procedure: C6/7 IL JOJO;  Surgeon: Fish Claudio MD;  Location: [facility];  Service: Pain Management;  Laterality: N/A;    EPIDURAL STEROID INJECTION INTO CERVICAL SPINE N/A 06/12/2024    Procedure: C6/7 IL JOJO;  Surgeon: Fish Claudio MD;  Location: [facility];  Service: Pain Management;  Laterality: N/A;    HYSTERECTOMY  1998         Social History:      Social History     Socioeconomic History    Marital status:    Tobacco Use    Smoking status: Never    Smokeless tobacco: Never   Substance and Sexual Activity    Alcohol use: Not Currently     Comment: rarely    Drug use: Not Currently    Sexual activity: Yes     Social Drivers of Health     Financial Resource Strain: Low Risk  (9/28/2024)    Overall Financial Resource Strain (CARDIA)     Difficulty of Paying Living Expenses: Not hard at all   Food Insecurity: No Food Insecurity (9/28/2024)    Hunger Vital Sign     Worried About Running Out of Food in the Last Year: Never true     Ran Out of Food in the Last Year: Never true   Transportation Needs: No Transportation Needs (10/11/2023)    Received from [facility] and Its Subsidiaries and Affiliates, [facility] and Its Subsidiaries and Affiliates    PRAPARE - Transportation     Lack of Transportation (Medical): No     Lack of Transportation (Non-Medical): No   Physical Activity: Inactive (9/28/2024)    Exercise Vital Sign     Days of Exercise per Week: 0 days     Minutes of Exercise per Session: 0 min   Stress: No Stress Concern Present (9/28/2024)    [employer] [place] [employer] - Occupational Stress Questionnaire     Feeling of Stress : Only a little   Housing Stability: Unknown (9/28/2024)    Housing Stability Vital Sign     Unable to Pay for Housing in the Last Year: No        Family History:      Family History   Problem Relation Name Age of Onset    Cancer Father          Colon cancer and Lymphoma       Allergies:      Review of patient's allergies indicates:   Allergen Reactions    Statins-hmg-coa reductase inhibitors Swelling and Other (See Comments)     Lower extremity cramp with pravastatin in 2016.    Sulfa (sulfonamide antibiotics)        Medications:      Current Outpatient Medications   Medication Sig    gabapentin (NEURONTIN) 300 MG  capsule Take 600 mg by mouth 2 (two) times daily.    irbesartan (AVAPRO) 75 MG tablet Take 75 mg by mouth every evening.    pantoprazole (PROTONIX) 40 MG tablet Take 40 mg by mouth once daily.    rosuvastatin (CRESTOR) 40 MG Tab Take 10 mg by mouth every evening.    sertraline (ZOLOFT) 50 MG tablet Take 50 mg by mouth once daily.    tirzepatide (MOUNJARO) 7.5 mg/0.5 mL PnIj Inject 7.5 mg into the skin every 7 days.    traMADoL (ULTRAM) 50 mg tablet Take 1 tablet (50 mg total) by mouth every 6 (six) hours as needed for Pain. (Patient not taking: Reported on 9/19/2024)    zolpidem (AMBIEN) 10 mg Tab Take 5 mg by mouth nightly as needed.     No current facility-administered medications for this visit.       Vitals:      Vitals:    09/30/24 0810   BP: (!) 107/58   Pulse: 93   Resp: 17   Temp: 97.2 °F (36.2 °C)       Review of Systems:        Constitutional: Negative for fever, chills, weight loss, malaise/fatigue and diaphoresis.   HENT: Negative for hearing loss, ear pain, nosebleeds, congestion, sore throat, tinnitus and ear discharge.   + neck pain  Eyes: Negative for blurred vision, double vision, photophobia, pain, discharge and redness.   Respiratory: Negative for cough, hemoptysis, sputum production, shortness of breath, wheezing and stridor.    Cardiovascular: Negative for chest pain, palpitations, orthopnea, claudication, leg swelling and PND.   Gastrointestinal: Negative for heartburn, nausea, vomiting, abdominal pain, diarrhea, constipation, blood in stool and melena.   Genitourinary: Negative for dysuria, urgency, frequency, hematuria and flank pain.   Musculoskeletal: Negative for myalgias, back pain, joint pain and falls. + bilateral shoulder pain   Skin: Negative for itching and rash.   Neurological: Negative for dizziness, tingling, tremors, sensory change, speech change, focal weakness, seizures, loss of consciousness, weakness and headaches.   Endo/Heme/Allergies: Negative for environmental allergies  and polydipsia. Does not bruise/bleed easily.   Psychiatric/Behavioral: Negative for depression, suicidal ideas, hallucinations, memory loss and substance abuse. The patient is not nervous/anxious and does not have insomnia.    All 14 systems reviewed and negative except as noted above.    Physical Exam:      Constitutional: Appears well-developed, well-nourished and in no acute distress.  Patient is oriented to person, place, and time.   Head: Normocephalic and atraumatic. Mucous membranes moist.  Neck: Neck supple no mass. Abnormal ROM noted due to pain  Cardiovascular: Normal rate and regular rhythm.  S1 S2 appreciated by ascultation.  Pulmonary/Chest: Effort normal and clear to auscultation bilaterally. No respiratory distress.   Abdomen: Soft. Non-tender and non-distended. Bowel sounds are normal.   Neurological: Patient is alert and oriented to person, place and time. Moves all extremities.  Skin: Warm and dry. No lesions.  Extremities: No clubbing, cyanosis or edema.    Laboratory data:      Reviewed and noted in plan where applicable. Please see chart for full laboratory data.     Lab Results   Component Value Date    INR 1.0 09/27/2024    INR 1.0 04/13/2021     CBC and Differential  Specimen: Blood - Other (qualifier value)   Ref Range & Units 6 d ago   White Blood Cell Count 4.0 - 11.0 K/uL 6.3   Red Blood Cell Count 4.20 - 5.40 M/uL 4.60   Hemoglobin 12.0 - 16.0 g/dL 13.8   Hematocrit 37.0 - 47.0 % 40.5   Mean Corpuscular Volume 80.0 - 100.0 fL 88.0   MEAN CORPUSCULAR HEMOGLOBIN 27.0 - 31.0 pg 30.0   Mean Corpuscular Hemoglobin Conc 31.0 - 37.0 g/dL 34.1   Red Cell Distribution Width 11.6 - 14.8 % 14.0   Platelet Count 150 - 450 K/uL 207   Neutrophils 37.0 - 80.0 % 56.0   Lymphs 10.0 - 50.0 % 32.6   Monocytes 0.0 - 15.0 % 9.2   Eos 0.0 - 7.0 % 1.7   Basos 0.0 - 5.0 % 0.5   Neutrophils Abs 2.0 - 8.0 10^3/uL 3.5   Lymphocytes Abs 0.6 - 3.8 10^3/uL 2.1   Monocytes Abs 0.0 - 1.5 10^3/uL 0.6   Eosinophils Abs  0.0 - 0.7 10^3/uL 0.1   Basophils Abs 0.0 - 0.2 10^3/uL 0.0   Nucleated RBC  0.1   Resulting Agency  [facility]   Specimen Collected: 09/24/24 07:55    Performed by: [facility] Last Resulted: 09/24/24 08:25   Received From: [facility] and Its Subsidiaries and Affiliates  Result Received: 09/26/24 08:18     Comprehensive metabolic panel  Specimen: Plasma - Venous structure (body structure)   Ref Range & Units 6 d ago   Glucose <100 mg/dL 113 High    BUN 10 - 20 mg/dL 16   Creatinine, Ser 0.57 - 1.11 mg/dL 1.11   Total Bilirubin 0.2 - 1.2 mg/dL 0.7   Alkaline Phosphatase 40.0 - 150.0 U/L 107.0   ALT 0 - 55 U/L 30   AST 8 - 48 U/L 26   Total Protein 6.0 - 8.3 g/dL 7.1   Calcium 8.4 - 10.5 mg/dL 9.7   Sodium 136 - 145 mmol/L 139   Potassium 3.4 - 5.1 mmol/L 4.4   Note:  As of 1/30/24, specimen type for blood electrolyte testing is Plasma   Chloride 98.0 - 111.0 mmol/L 108.1   CO2 Total 23 - 31 mEq/L 22 Low    Albumin, Ser 3.2 - 4.6 g/dL 4.5   EGFR mL/min/1.73m^2 55   Resulting Agency  [facility]   Narrative  Performed by [facility]        Predictors of intubation difficulty:       Morbid obesity? no   Anatomically abnormal facies? no   Prominent incisors? no   Receding mandible? no   Short, thick neck? no   Neck range of motion: abnormal   Dentition: No chipped, loose, or missing teeth.  Based on the Modified Mallampati, patient is a mallampati score: II (hard and soft palate, upper portion of tonsils anduvula visible)    Cardiographics:      ECG: normal sinus rhythm  Echocardiogram: not indicated    Imaging:      Chest x-ray: The lungs are clear and free of infiltrate. No pleural effusion or pneumothorax. The heart is not enlarged. There is mild tortuosity of the descending thoracic aorta. Surgical clips noted in the right upper quadrant of the abdomen. No acute cardiopulmonary process per imaging on  09/27/2024    Assessment and Plan:      1. Cervical stenosis of spinal canal  Assessment & Plan:  FUSION, SPINE, CERVICAL, ANTERIOR APPROACH (C4-C7) planned per Dr. Morales on 10/7/2024     Known risk factors for perioperative complications: None    Difficulty with intubation is not anticipated.    Cardiac Risk Estimation: Based on the Revised Cardiac Risk index, patient is a Class I risk with a 3.9 % risk of a major cardiac event in a low risk procedure.    1.) Preoperative workup as follows: chest x-ray, ECG, hemoglobin, hematocrit, electrolytes, creatinine, glucose, liver function studies.  2.) Change in medication regimen before surgery: discontinue ASA 6 days before surgery, discontinue NSAIDs 5 days before surgery, hold Metformin 24 hours prior to surgery. Hold all vitamins/supplements for 7 days prior to surgery, with the exception of Potassium and Iron supplementation. Hold semaglutide/tirzepatide for 7 days prior to surgery.   3.) Prophylaxis for cardiac events with perioperative beta-blockers: not indicated.  4.) Invasive hemodynamic monitoring perioperatively: at the discretion of anesthesiologist.  5.) Deep vein thrombosis prophylaxis postoperatively: regimen to be chosen by surgical team.  6.) Surveillance for postoperative MI with ECG immediately postoperatively and on postoperati ve days 1 and 2 AND troponin levels 24 hours postoperatively and on day 4 or hospital discharge (whichever comes first): at the discretion of anesthesiologist.  7.) Current medications which may produce withdrawal symptoms if withheld perioperatively: None   8.) Other measures: Postoperative hypertension management with IV hydralazine until able to take oral medications.  Postoperative incentive spirometry to prevent pneumonia.  -please take Sertaline and Protonix morning of surgery        2. Pre-op testing  -     Ambulatory referral/consult to Pre-Admit    3. PONV (postoperative nausea and vomiting)  Assessment & Plan:  -IV  antiemetics as needed for nausea/vomiting symptom control post procedure      4. Anxiety  Assessment & Plan:  -stable and controlled  -sertraline continued- take morning of surgery      5. Chronic kidney disease, stage 2 (mild)  Assessment & Plan:  -stable  -BUN/creatinine 16/11.1 and eGFR 55 per CMP on 09/27/2024      6. Cervical radiculopathy  Overview:  chronic at C5,6    Assessment & Plan:  -controlled on prescribed medication regime   -Neurontin continued as prescribed   -Tramadol continued as needed   -careful attention to positioning during procedure      7. Impaired fasting glucose  Assessment & Plan:  -HbA1c 5.7 on 9/24/24  -patient currently taking Mounjaro- last dose reported on 9/22/2024  -patient reports weight loss of 20 lbs.      8. Mixed hyperlipidemia  Assessment & Plan:  -Rosuvastatin continued- patient takes once weekly   -patient states she will resume OTC fish oil postprocedure      9. SANDRA (obstructive sleep apnea)  Overview:  Formatting of this note might be different from the original. HSAT: 11/10/16: AHI 12.3, low saturation 84 percent with associated hypersomnolence 11/25/16: APAP trial 5-20 cm water utilizing her choice of interface and heated humidity through health management    Assessment & Plan:  -patient reports nightly compliance with CPAP       10. Primary osteoarthritis involving multiple joints  Assessment & Plan:  -tramadol as needed continued- hold morning of surgery  -Tylenol as needed for pain  -careful attention to positioning during procedure      11. Psychophysiological insomnia  Assessment & Plan:  -stable  -Ambien continued nightly as needed      12. Hypertension secondary to other renal disorders  Assessment & Plan:  -/58 in the setting of 20 lb weight loss  -patient currently taking Irbesartan nightly  -patient advised to take blood pressure twice daily at home and record findings for review with PCP  -followed outpatient by PCP    13. Gastroesophageal reflux  disease   --Protonix continued- take morning of surgery           Electronically signed by Mindi Schneider NP on 9/30/2024 at 8:25 AM.

## 2024-09-30 NOTE — ASSESSMENT & PLAN NOTE
-controlled on prescribed medication regime   -Neurontin continued as prescribed   -Tramadol as needed \  -careful attention to positioning during procedure

## 2024-09-30 NOTE — ASSESSMENT & PLAN NOTE
-HbA1c 5.7 on 9/24/24  -patient currently taking Mounjaro- last dose reported on 9/22/2024  -patient reports weight loss of 20 lbs.

## 2024-09-30 NOTE — ASSESSMENT & PLAN NOTE
-/58 in the setting of 20 lb weight loss  -patient took currently taking Irbesartan nightly  -followed outpatient by PCP

## 2024-09-30 NOTE — ASSESSMENT & PLAN NOTE
-tramadol as needed continued- hold morning of surgery  -Tylenol as needed for pain  -careful attention to positioning during procedure

## 2024-09-30 NOTE — ASSESSMENT & PLAN NOTE
-Rosuvastatin continued- patient takes once weekly   -patient states she will resume OTC fish oil postprocedure

## 2024-10-02 ENCOUNTER — PATIENT MESSAGE (OUTPATIENT)
Dept: NEUROSURGERY | Facility: CLINIC | Age: 64
End: 2024-10-02
Payer: MEDICARE

## 2024-10-04 ENCOUNTER — PATIENT MESSAGE (OUTPATIENT)
Dept: NEUROSURGERY | Facility: CLINIC | Age: 64
End: 2024-10-04
Payer: MEDICARE

## 2024-10-04 NOTE — PRE-PROCEDURE INSTRUCTIONS
Called and spoke with pt about the following:     Please arrive to [facility] at 0530 am on 10/7/2024 for your scheduled procedure.  Address: [address] [place] [address] [place] [address] (2nd Building on left, 1st Floor Lobby)  >>>NO eating or drinking after midnight unless instructed otherwise by your Surgeon<<<    Thank you,  -[facility] Pre Admit Testing Dept.  Mon-Fri 7:30 am - 4 pm [phone]

## 2024-10-06 ENCOUNTER — ANESTHESIA EVENT (OUTPATIENT)
Dept: SURGERY | Facility: HOSPITAL | Age: 64
End: 2024-10-06
Payer: MEDICARE

## 2024-10-06 NOTE — ANESTHESIA PREPROCEDURE EVALUATION
10/06/2024  Elina Franco is a 64 y.o., female.      Pre-op Assessment    I have reviewed the Patient Summary Reports.     I have reviewed the Nursing Notes. I have reviewed the NPO Status.      Review of Systems  Anesthesia Hx:  No problems with previous Anesthesia              Personal Hx of Anesthesia complications, Post-Operative Nausea/Vomiting                    Hematology/Oncology:  Hematology Normal   Oncology Normal                                   Cardiovascular:     Hypertension                                        Pulmonary:        Sleep Apnea                Renal/:  Chronic Renal Disease                Hepatic/GI:     GERD             Musculoskeletal:  Arthritis               Neurological:    Neuromuscular Disease,                                   Endocrine:  Endocrine Normal            Dermatological:  Skin Normal    Psych:  Psychiatric History                  Physical Exam  General: Well nourished, Cooperative, Alert and Oriented    Airway:  Mallampati: III / III  Mouth Opening: Normal  TM Distance: Normal  Tongue: Normal  Neck ROM: Extension Decreased, Flexion Decreased    Dental:  Intact      Patient Active Problem List   Diagnosis    Anxiety    Degenerative lumbar disc    GERD (gastroesophageal reflux disease)    DDD (degenerative disc disease), cervical    Hypertension secondary to other renal disorders    Impaired fasting glucose    Moderate episode of recurrent major depressive disorder    Nephrotic syndrome with minimal change glomerulonephritis    SANDRA (obstructive sleep apnea)    Osteopenia    Primary osteoarthritis involving multiple joints    Restless leg syndrome    Psychophysiological insomnia    Chronic kidney disease, stage 2 (mild)    Statin intolerance    Mixed hyperlipidemia    Bilateral carpal tunnel syndrome    Cervical radiculopathy    PONV (postoperative nausea and  vomiting)    Cervical stenosis of spinal canal           Anesthesia Plan  Type of Anesthesia, risks & benefits discussed:    Anesthesia Type: Gen ETT  Intra-op Monitoring Plan: Standard ASA Monitors  Post Op Pain Control Plan: multimodal analgesia  Induction:  IV  Airway Plan: Direct  Informed Consent: Informed consent signed with the Patient and all parties understand the risks and agree with anesthesia plan.  All questions answered.   ASA Score: 3  Day of Surgery Review of History & Physical: H&P Update referred to the surgeon/provider.I have interviewed and examined the patient. I have reviewed the patient's H&P dated: There are no significant changes. H&P completed by Anesthesiologist.    Ready For Surgery From Anesthesia Perspective.     .

## 2024-10-07 ENCOUNTER — ANESTHESIA (OUTPATIENT)
Dept: SURGERY | Facility: HOSPITAL | Age: 64
End: 2024-10-07
Payer: MEDICARE

## 2024-10-09 ENCOUNTER — TELEPHONE (OUTPATIENT)
Dept: NEUROSURGERY | Facility: CLINIC | Age: 64
End: 2024-10-09
Payer: MEDICARE

## 2024-10-09 NOTE — TELEPHONE ENCOUNTER
----- Message from Zhannaletyjose sent at 10/9/2024  1:36 PM CDT -----  Contact: Elina  Type:  Patient Returning Call    Who Called:Patient   Who Left Message for Patient:Eleonora Knowles MA  Does the patient know what this is regarding?:Yes   Would the patient rather a call back or a response via [facility]? Call Back   Best Call Back Number:[phone]  Additional Information: She says that she will like to talk to you or Ysabel. She said that she still have not heard from anyone!

## 2024-10-09 NOTE — TELEPHONE ENCOUNTER
Spoke with patient via [facility] regarding surgery and insurance determination.

## 2024-11-08 ENCOUNTER — PATIENT MESSAGE (OUTPATIENT)
Dept: NEUROSURGERY | Facility: CLINIC | Age: 64
End: 2024-11-08
Payer: MEDICARE

## 2024-11-08 ENCOUNTER — TELEPHONE (OUTPATIENT)
Dept: NEUROSURGERY | Facility: CLINIC | Age: 64
End: 2024-11-08
Payer: MEDICARE

## 2024-11-08 NOTE — TELEPHONE ENCOUNTER
Spoke with patient informed her of approval for procedure. Patient stated that she would like to wait until after Thanksgiving to have procedure done. Informed patient that she would get tentative surgery date and also informed her that she will have to get pre-op testing repeated. Patient verbalized understanding.

## 2024-11-21 DIAGNOSIS — Z01.818 PRE-OP TESTING: Primary | ICD-10-CM

## 2024-11-21 DIAGNOSIS — R79.1 ABNORMAL COAGULATION PROFILE: ICD-10-CM

## 2024-11-22 ENCOUNTER — LAB VISIT (OUTPATIENT)
Dept: LAB | Facility: HOSPITAL | Age: 64
End: 2024-11-22
Attending: NEUROLOGICAL SURGERY
Payer: MEDICARE

## 2024-11-22 ENCOUNTER — TELEPHONE (OUTPATIENT)
Dept: PREADMISSION TESTING | Facility: HOSPITAL | Age: 64
End: 2024-11-22
Payer: MEDICARE

## 2024-11-22 DIAGNOSIS — R79.1 ABNORMAL COAGULATION PROFILE: ICD-10-CM

## 2024-11-22 DIAGNOSIS — Z01.818 PRE-OP TESTING: ICD-10-CM

## 2024-11-22 LAB
ALBUMIN SERPL BCP-MCNC: 4.3 G/DL (ref 3.5–5.2)
ALBUMIN SERPL BCP-MCNC: 4.3 G/DL (ref 3.5–5.2)
ALP SERPL-CCNC: 100 U/L (ref 40–150)
ALP SERPL-CCNC: 100 U/L (ref 40–150)
ALT SERPL W/O P-5'-P-CCNC: 32 U/L (ref 10–44)
ALT SERPL W/O P-5'-P-CCNC: 32 U/L (ref 10–44)
ANION GAP SERPL CALC-SCNC: 9 MMOL/L (ref 8–16)
ANION GAP SERPL CALC-SCNC: 9 MMOL/L (ref 8–16)
AST SERPL-CCNC: 23 U/L (ref 10–40)
AST SERPL-CCNC: 23 U/L (ref 10–40)
BASOPHILS # BLD AUTO: 0.02 K/UL (ref 0–0.2)
BASOPHILS NFR BLD: 0.4 % (ref 0–1.9)
BILIRUB SERPL-MCNC: 0.5 MG/DL (ref 0.1–1)
BILIRUB SERPL-MCNC: 0.5 MG/DL (ref 0.1–1)
BUN SERPL-MCNC: 14 MG/DL (ref 8–23)
BUN SERPL-MCNC: 14 MG/DL (ref 8–23)
CALCIUM SERPL-MCNC: 9.8 MG/DL (ref 8.7–10.5)
CALCIUM SERPL-MCNC: 9.8 MG/DL (ref 8.7–10.5)
CHLORIDE SERPL-SCNC: 107 MMOL/L (ref 95–110)
CHLORIDE SERPL-SCNC: 107 MMOL/L (ref 95–110)
CO2 SERPL-SCNC: 23 MMOL/L (ref 23–29)
CO2 SERPL-SCNC: 23 MMOL/L (ref 23–29)
CREAT SERPL-MCNC: 1.1 MG/DL (ref 0.5–1.4)
CREAT SERPL-MCNC: 1.1 MG/DL (ref 0.5–1.4)
DIFFERENTIAL METHOD BLD: ABNORMAL
EOSINOPHIL # BLD AUTO: 0.1 K/UL (ref 0–0.5)
EOSINOPHIL NFR BLD: 2 % (ref 0–8)
ERYTHROCYTE [DISTWIDTH] IN BLOOD BY AUTOMATED COUNT: 13.2 % (ref 11.5–14.5)
EST. GFR  (NO RACE VARIABLE): 55.8 ML/MIN/1.73 M^2
EST. GFR  (NO RACE VARIABLE): 55.8 ML/MIN/1.73 M^2
GLUCOSE SERPL-MCNC: 104 MG/DL (ref 70–110)
GLUCOSE SERPL-MCNC: 104 MG/DL (ref 70–110)
HCT VFR BLD AUTO: 42.5 % (ref 37–48.5)
HGB BLD-MCNC: 14.6 G/DL (ref 12–16)
IMM GRANULOCYTES # BLD AUTO: 0.01 K/UL (ref 0–0.04)
IMM GRANULOCYTES NFR BLD AUTO: 0.2 % (ref 0–0.5)
INR PPP: 1 (ref 0.8–1.2)
LYMPHOCYTES # BLD AUTO: 1.9 K/UL (ref 1–4.8)
LYMPHOCYTES NFR BLD: 33.9 % (ref 18–48)
MCH RBC QN AUTO: 31.4 PG (ref 27–31)
MCHC RBC AUTO-ENTMCNC: 34.4 G/DL (ref 32–36)
MCV RBC AUTO: 91 FL (ref 82–98)
MONOCYTES # BLD AUTO: 0.5 K/UL (ref 0.3–1)
MONOCYTES NFR BLD: 9.6 % (ref 4–15)
NEUTROPHILS # BLD AUTO: 3 K/UL (ref 1.8–7.7)
NEUTROPHILS NFR BLD: 53.9 % (ref 38–73)
NRBC BLD-RTO: 0 /100 WBC
PLATELET # BLD AUTO: 217 K/UL (ref 150–450)
PMV BLD AUTO: 11.8 FL (ref 9.2–12.9)
POTASSIUM SERPL-SCNC: 4.3 MMOL/L (ref 3.5–5.1)
POTASSIUM SERPL-SCNC: 4.3 MMOL/L (ref 3.5–5.1)
PROT SERPL-MCNC: 7.2 G/DL (ref 6–8.4)
PROT SERPL-MCNC: 7.2 G/DL (ref 6–8.4)
PROTHROMBIN TIME: 10.9 SEC (ref 9–12.5)
RBC # BLD AUTO: 4.65 M/UL (ref 4–5.4)
SODIUM SERPL-SCNC: 139 MMOL/L (ref 136–145)
SODIUM SERPL-SCNC: 139 MMOL/L (ref 136–145)
WBC # BLD AUTO: 5.54 K/UL (ref 3.9–12.7)

## 2024-11-22 PROCEDURE — 85610 PROTHROMBIN TIME: CPT | Performed by: NEUROLOGICAL SURGERY

## 2024-11-22 PROCEDURE — 36415 COLL VENOUS BLD VENIPUNCTURE: CPT | Performed by: NEUROLOGICAL SURGERY

## 2024-11-22 PROCEDURE — 85025 COMPLETE CBC W/AUTO DIFF WBC: CPT | Performed by: NEUROLOGICAL SURGERY

## 2024-11-22 PROCEDURE — 80053 COMPREHEN METABOLIC PANEL: CPT | Performed by: NEUROLOGICAL SURGERY

## 2024-11-27 ENCOUNTER — PATIENT MESSAGE (OUTPATIENT)
Dept: PREADMISSION TESTING | Facility: HOSPITAL | Age: 64
End: 2024-11-27
Payer: MEDICARE

## 2024-11-27 RX ORDER — MULTIVITAMIN WITH IRON
TABLET ORAL DAILY
Status: ON HOLD | COMMUNITY
End: 2024-12-03 | Stop reason: HOSPADM

## 2024-11-27 NOTE — PRE-PROCEDURE INSTRUCTIONS
Pre op instructions reviewed with pt over telephone, verbalized understanding.    Procedure Date: 12/2/24  Arrival Time:  TBD; We will call you after 2pm the day before your procedure with your arrival time.    Address:   [facility] (Off [facility] Building on the left)  [id number] [place] [address] [place] [address]  >>>Please enter through front entrance Lobby of 1st floor to Registration desk<<<        !!!INSTRUCTIONS IMPORTANT!!!  NO FOOD or tobacco products after midnight the night before surgery! You may have clear liquids up to 3 hrs before your arrival to the Hospital  Clear liquids include Gatorade, water, soda, black coffee or tea (no milk or creamer), and clear juices.  Clear liquids do NOT include anything with pulp or food particles (Chicken broth, ice cream, yogurt, Jello, etc.)    >>>MEDICATION INSTRUCTIONS<<<: Morning of Surgery, take small sip of water with ONLY these medications:  Sertraline  Protonix      *Diabetic/ Prediabetic Patients: !!!If you take diabetic or weight loss medication, Do NOT take morning of surgery unless instructed by Doctor!!!  Metformin to be stopped 24 hrs prior to surgery.   Long Acting Insulin Instructions: HOLD the night before surgery unless instructed differently by Provider!  Ozempic/ Mounjaro/ Wegovy/ Trulicity/ Semaglutide injections or weight loss medication to be stopped 7 days prior to surgery.    !!!STOP ALL Aspirins, NSAIDS, WEIGHT LOSS INJECTIONS/PILLS, Herbal supplements, & Vitamins 7 DAYS BEFORE SURGERY!!!    ____  Avoid Alcoholic beverages 3 days prior to surgery, as it can thin the blood.  ____  NO Acrylic/fake nails or nail polish worn day of surgery (specifically hand/arm & foot surgeries).  ____  NO powder, lotions, deodorants, oils or cream on body.  ____  Remove all jewelry & piercings & foreign objects before arrival & leave at home.  ____  Remove Dentures, Hearing Aids & Contact Lens prior to surgery.  ____  Bring photo ID and  insurance information to hospital (Leave Valuables at Home).  ____  If going home the same day, arrange for a ride home. You will not be able to drive for 24 hrs if Anesthesia was used.   ____  Females (ages 11-60): may need to give a urine sample the morning of surgery; please see Pre op Nurse prior to using the restroom.  ____  Males: Stop ED medications (Viagra, Cialis) 24 hrs prior to surgery.  ____  Wear clean, loose fitting clothing to allow for dressings/ bandages.      Bathing Instructions:    -Shower with anti-bacterial Soap (Hibiclens or Dial) the night before surgery and the morning of.   -Do not use Hibiclens on your face or genitals.   -Apply clean clothes after shower.  -Do not shave your face or body 2 days prior to surgery unless instructed otherwise by your Surgeon.  -Do not shave pubic hair 7 days prior to surgery (gyn pt's).    [facility] Visitor/Ride Policy:  Only 2 adults allowed in pre op/recovery area during your procedure. You MUST HAVE A RIDE HOME from a responsible adult that you know and trust. Medical Transport, Uber or Lyft can ONLY be used if patient has a responsible adult to accompany them during ride home.       *Signs and symptoms of Infection Before or After Surgery:               !!!If you experience any fever, chills, nausea/ vomiting, foul odor/ excessive drainage from surgical site, flu-like symptoms, new wounds or cuts, PLEASE CALL THE SURGEON OFFICE at [phone] or SEND MESSAGE THROUGH [facility] PORTAL!!!     *If you are running late the morning of surgery, please call the Hospital Surgery Dept @ [phone].     *Billing questions:  [phone]     Thank you,  -[facility] Pre Admit Dept.  [phone] or [phone]  M-F 7:30 am-4:00 pm (Closed Major Holidays)

## 2024-11-29 ENCOUNTER — PATIENT MESSAGE (OUTPATIENT)
Dept: PREADMISSION TESTING | Facility: HOSPITAL | Age: 64
End: 2024-11-29
Payer: MEDICARE

## 2024-11-29 NOTE — PRE-PROCEDURE INSTRUCTIONS
Called and spoke with pt about the following:     Please arrive to [facility] ([facility] at 0530 am on 12/2/2024 for your scheduled procedure.  Address: [address] [place] [address] [place] [address] (2nd Building on left, 1st Floor Lobby)    !!!NO FOOD after midnight! You may have clear liquids up to 3 hrs before your arrival to the Hospital!!!  Clear liquids include Gatorade, water, soda, black coffee or tea (no milk or creamer), and clear juices.  Clear liquids do NOT include anything with pulp or food particles (Chicken broth, ice cream, yogurt, Jello, etc.)    Thank you,  -[facility] Pre Admit Dept.  Mon-Fri 7:30 am - 4 pm [phone]

## 2024-12-02 ENCOUNTER — HOSPITAL ENCOUNTER (OUTPATIENT)
Facility: HOSPITAL | Age: 64
LOS: 1 days | Discharge: HOME | End: 2024-12-03
Attending: NEUROLOGICAL SURGERY | Admitting: NEUROLOGICAL SURGERY
Payer: MEDICARE

## 2024-12-02 DIAGNOSIS — Z98.890 POST-OPERATIVE STATE: ICD-10-CM

## 2024-12-02 DIAGNOSIS — M48.02 CERVICAL STENOSIS OF SPINAL CANAL: Primary | ICD-10-CM

## 2024-12-02 PROBLEM — I10 PRIMARY HYPERTENSION: Status: ACTIVE | Noted: 2024-12-02

## 2024-12-02 LAB
ABO + RH BLD: NORMAL
ANION GAP SERPL CALC-SCNC: 8 MMOL/L (ref 8–16)
BASOPHILS # BLD AUTO: 0.01 K/UL (ref 0–0.2)
BASOPHILS NFR BLD: 0.2 % (ref 0–1.9)
BLD GP AB SCN CELLS X3 SERPL QL: NORMAL
BUN SERPL-MCNC: 12 MG/DL (ref 8–23)
CALCIUM SERPL-MCNC: 8.9 MG/DL (ref 8.7–10.5)
CHLORIDE SERPL-SCNC: 111 MMOL/L (ref 95–110)
CO2 SERPL-SCNC: 22 MMOL/L (ref 23–29)
CREAT SERPL-MCNC: 0.8 MG/DL (ref 0.5–1.4)
DIFFERENTIAL METHOD BLD: ABNORMAL
EOSINOPHIL # BLD AUTO: 0.2 K/UL (ref 0–0.5)
EOSINOPHIL NFR BLD: 2.3 % (ref 0–8)
ERYTHROCYTE [DISTWIDTH] IN BLOOD BY AUTOMATED COUNT: 13 % (ref 11.5–14.5)
EST. GFR  (NO RACE VARIABLE): >60 ML/MIN/1.73 M^2
GLUCOSE SERPL-MCNC: 110 MG/DL (ref 70–110)
HCT VFR BLD AUTO: 36 % (ref 37–48.5)
HGB BLD-MCNC: 12.2 G/DL (ref 12–16)
IMM GRANULOCYTES # BLD AUTO: 0.02 K/UL (ref 0–0.04)
IMM GRANULOCYTES NFR BLD AUTO: 0.3 % (ref 0–0.5)
LYMPHOCYTES # BLD AUTO: 2.4 K/UL (ref 1–4.8)
LYMPHOCYTES NFR BLD: 35.7 % (ref 18–48)
MCH RBC QN AUTO: 30.4 PG (ref 27–31)
MCHC RBC AUTO-ENTMCNC: 33.9 G/DL (ref 32–36)
MCV RBC AUTO: 90 FL (ref 82–98)
MONOCYTES # BLD AUTO: 0.5 K/UL (ref 0.3–1)
MONOCYTES NFR BLD: 7.5 % (ref 4–15)
NEUTROPHILS # BLD AUTO: 3.6 K/UL (ref 1.8–7.7)
NEUTROPHILS NFR BLD: 54 % (ref 38–73)
NRBC BLD-RTO: 0 /100 WBC
PLATELET # BLD AUTO: 159 K/UL (ref 150–450)
PMV BLD AUTO: 11.1 FL (ref 9.2–12.9)
POCT GLUCOSE: 87 MG/DL (ref 70–110)
POTASSIUM SERPL-SCNC: 3.8 MMOL/L (ref 3.5–5.1)
RBC # BLD AUTO: 4.01 M/UL (ref 4–5.4)
SODIUM SERPL-SCNC: 141 MMOL/L (ref 136–145)
WBC # BLD AUTO: 6.64 K/UL (ref 3.9–12.7)

## 2024-12-02 PROCEDURE — 80048 BASIC METABOLIC PNL TOTAL CA: CPT | Performed by: PHYSICIAN ASSISTANT

## 2024-12-02 PROCEDURE — 37000009 HC ANESTHESIA EA ADD 15 MINS: Performed by: NEUROLOGICAL SURGERY

## 2024-12-02 PROCEDURE — 22853 INSJ BIOMECHANICAL DEVICE: CPT | Mod: ,,, | Performed by: NEUROLOGICAL SURGERY

## 2024-12-02 PROCEDURE — 36000711: Performed by: NEUROLOGICAL SURGERY

## 2024-12-02 PROCEDURE — 27201423 OPTIME MED/SURG SUP & DEVICES STERILE SUPPLY: Performed by: NEUROLOGICAL SURGERY

## 2024-12-02 PROCEDURE — 20930 SP BONE ALGRFT MORSEL ADD-ON: CPT | Mod: ,,, | Performed by: NEUROLOGICAL SURGERY

## 2024-12-02 PROCEDURE — 63600175 PHARM REV CODE 636 W HCPCS: Performed by: PHYSICIAN ASSISTANT

## 2024-12-02 PROCEDURE — 37000008 HC ANESTHESIA 1ST 15 MINUTES: Performed by: NEUROLOGICAL SURGERY

## 2024-12-02 PROCEDURE — 25000003 PHARM REV CODE 250: Performed by: ANESTHESIOLOGY

## 2024-12-02 PROCEDURE — 94761 N-INVAS EAR/PLS OXIMETRY MLT: CPT

## 2024-12-02 PROCEDURE — 36000710: Performed by: NEUROLOGICAL SURGERY

## 2024-12-02 PROCEDURE — 0RB30ZZ EXCISION OF CERVICAL VERTEBRAL DISC, OPEN APPROACH: ICD-10-PCS | Performed by: NEUROLOGICAL SURGERY

## 2024-12-02 PROCEDURE — 63600175 PHARM REV CODE 636 W HCPCS: Performed by: NURSE ANESTHETIST, CERTIFIED REGISTERED

## 2024-12-02 PROCEDURE — C1729 CATH, DRAINAGE: HCPCS | Performed by: NEUROLOGICAL SURGERY

## 2024-12-02 PROCEDURE — 25000003 PHARM REV CODE 250: Performed by: PHYSICIAN ASSISTANT

## 2024-12-02 PROCEDURE — 00NW0ZZ RELEASE CERVICAL SPINAL CORD, OPEN APPROACH: ICD-10-PCS | Performed by: NEUROLOGICAL SURGERY

## 2024-12-02 PROCEDURE — 22853 INSJ BIOMECHANICAL DEVICE: CPT | Mod: AS,,, | Performed by: PHYSICIAN ASSISTANT

## 2024-12-02 PROCEDURE — 25000003 PHARM REV CODE 250: Performed by: NURSE ANESTHETIST, CERTIFIED REGISTERED

## 2024-12-02 PROCEDURE — C1713 ANCHOR/SCREW BN/BN,TIS/BN: HCPCS | Performed by: NEUROLOGICAL SURGERY

## 2024-12-02 PROCEDURE — 22845 INSERT SPINE FIXATION DEVICE: CPT | Mod: 59,,, | Performed by: NEUROLOGICAL SURGERY

## 2024-12-02 PROCEDURE — 22552 ARTHRD ANT NTRBD CERVICAL EA: CPT | Mod: AS,,, | Performed by: PHYSICIAN ASSISTANT

## 2024-12-02 PROCEDURE — 22551 ARTHRD ANT NTRBDY CERVICAL: CPT | Mod: AS,,, | Performed by: PHYSICIAN ASSISTANT

## 2024-12-02 PROCEDURE — 11000001 HC ACUTE MED/SURG PRIVATE ROOM

## 2024-12-02 PROCEDURE — 63600175 PHARM REV CODE 636 W HCPCS: Performed by: ANESTHESIOLOGY

## 2024-12-02 PROCEDURE — 22552 ARTHRD ANT NTRBD CERVICAL EA: CPT | Mod: ,,, | Performed by: NEUROLOGICAL SURGERY

## 2024-12-02 PROCEDURE — 86850 RBC ANTIBODY SCREEN: CPT | Performed by: NEUROLOGICAL SURGERY

## 2024-12-02 PROCEDURE — 85025 COMPLETE CBC W/AUTO DIFF WBC: CPT | Performed by: PHYSICIAN ASSISTANT

## 2024-12-02 PROCEDURE — 71000033 HC RECOVERY, INTIAL HOUR: Performed by: NEUROLOGICAL SURGERY

## 2024-12-02 PROCEDURE — 22551 ARTHRD ANT NTRBDY CERVICAL: CPT | Mod: ,,, | Performed by: NEUROLOGICAL SURGERY

## 2024-12-02 PROCEDURE — 01N10ZZ RELEASE CERVICAL NERVE, OPEN APPROACH: ICD-10-PCS | Performed by: NEUROLOGICAL SURGERY

## 2024-12-02 PROCEDURE — 63600175 PHARM REV CODE 636 W HCPCS: Performed by: NEUROLOGICAL SURGERY

## 2024-12-02 PROCEDURE — 71000039 HC RECOVERY, EACH ADD'L HOUR: Performed by: NEUROLOGICAL SURGERY

## 2024-12-02 PROCEDURE — 0RG20A0 FUSION OF 2 OR MORE CERVICAL VERTEBRAL JOINTS WITH INTERBODY FUSION DEVICE, ANTERIOR APPROACH, ANTERIOR COLUMN, OPEN APPROACH: ICD-10-PCS | Performed by: NEUROLOGICAL SURGERY

## 2024-12-02 PROCEDURE — 22845 INSERT SPINE FIXATION DEVICE: CPT | Mod: 59,AS,, | Performed by: PHYSICIAN ASSISTANT

## 2024-12-02 PROCEDURE — 25000003 PHARM REV CODE 250: Performed by: NEUROLOGICAL SURGERY

## 2024-12-02 PROCEDURE — 94799 UNLISTED PULMONARY SVC/PX: CPT | Mod: XB

## 2024-12-02 PROCEDURE — 99900035 HC TECH TIME PER 15 MIN (STAT)

## 2024-12-02 DEVICE — SCREW PK2 G6623513 SA S-D 3.5MM X 13MM
Type: IMPLANTABLE DEVICE | Site: SPINE CERVICAL | Status: FUNCTIONAL
Brand: DIVERGENCE® ANTERIOR CERVICAL FUSION SYSTEM

## 2024-12-02 DEVICE — PLATE ATLANTIS ANT CERV 37.5MM: Type: IMPLANTABLE DEVICE | Site: SPINE CERVICAL | Status: FUNCTIONAL

## 2024-12-02 DEVICE — IMPLANTABLE DEVICE: Type: IMPLANTABLE DEVICE | Site: SPINE CERVICAL | Status: FUNCTIONAL

## 2024-12-02 DEVICE — CAGE 5030741 ANATOMIC PTC 14X11X7MM
Type: IMPLANTABLE DEVICE | Site: SPINE CERVICAL | Status: FUNCTIONAL
Brand: ANATOMIC PEEK PTC CERVICAL FUSION SYSTEM

## 2024-12-02 DEVICE — SCREW ATLANTIS VASD 4MM X14MM: Type: IMPLANTABLE DEVICE | Site: SPINE CERVICAL | Status: FUNCTIONAL

## 2024-12-02 RX ORDER — ONDANSETRON HYDROCHLORIDE 2 MG/ML
INJECTION, SOLUTION INTRAVENOUS
Status: DISCONTINUED | OUTPATIENT
Start: 2024-12-02 | End: 2024-12-02

## 2024-12-02 RX ORDER — HYDROMORPHONE HYDROCHLORIDE 1 MG/ML
2 INJECTION, SOLUTION INTRAMUSCULAR; INTRAVENOUS; SUBCUTANEOUS
Status: DISCONTINUED | OUTPATIENT
Start: 2024-12-02 | End: 2024-12-03 | Stop reason: HOSPADM

## 2024-12-02 RX ORDER — DOCUSATE SODIUM 100 MG/1
100 CAPSULE, LIQUID FILLED ORAL DAILY
Status: DISCONTINUED | OUTPATIENT
Start: 2024-12-02 | End: 2024-12-03 | Stop reason: HOSPADM

## 2024-12-02 RX ORDER — GABAPENTIN 300 MG/1
600 CAPSULE ORAL 2 TIMES DAILY
Status: DISCONTINUED | OUTPATIENT
Start: 2024-12-02 | End: 2024-12-03 | Stop reason: HOSPADM

## 2024-12-02 RX ORDER — ONDANSETRON HYDROCHLORIDE 2 MG/ML
4 INJECTION, SOLUTION INTRAVENOUS DAILY PRN
Status: DISCONTINUED | OUTPATIENT
Start: 2024-12-02 | End: 2024-12-02 | Stop reason: HOSPADM

## 2024-12-02 RX ORDER — ROCURONIUM BROMIDE 10 MG/ML
INJECTION, SOLUTION INTRAVENOUS
Status: DISCONTINUED | OUTPATIENT
Start: 2024-12-02 | End: 2024-12-02

## 2024-12-02 RX ORDER — HYDROMORPHONE HYDROCHLORIDE 2 MG/ML
2 INJECTION, SOLUTION INTRAMUSCULAR; INTRAVENOUS; SUBCUTANEOUS
Status: DISCONTINUED | OUTPATIENT
Start: 2024-12-02 | End: 2024-12-02 | Stop reason: HOSPADM

## 2024-12-02 RX ORDER — ACETAMINOPHEN 325 MG/1
650 TABLET ORAL EVERY 6 HOURS PRN
Status: DISCONTINUED | OUTPATIENT
Start: 2024-12-02 | End: 2024-12-03 | Stop reason: HOSPADM

## 2024-12-02 RX ORDER — PROCHLORPERAZINE EDISYLATE 5 MG/ML
5 INJECTION INTRAMUSCULAR; INTRAVENOUS EVERY 6 HOURS PRN
Status: DISCONTINUED | OUTPATIENT
Start: 2024-12-02 | End: 2024-12-03 | Stop reason: HOSPADM

## 2024-12-02 RX ORDER — CEFAZOLIN SODIUM 1 G/3ML
INJECTION, POWDER, FOR SOLUTION INTRAMUSCULAR; INTRAVENOUS
Status: DISCONTINUED | OUTPATIENT
Start: 2024-12-02 | End: 2024-12-02

## 2024-12-02 RX ORDER — LIDOCAINE HYDROCHLORIDE 20 MG/ML
INJECTION INTRAVENOUS
Status: DISCONTINUED | OUTPATIENT
Start: 2024-12-02 | End: 2024-12-02

## 2024-12-02 RX ORDER — MEPERIDINE HYDROCHLORIDE 25 MG/ML
12.5 INJECTION INTRAMUSCULAR; INTRAVENOUS; SUBCUTANEOUS ONCE AS NEEDED
Status: COMPLETED | OUTPATIENT
Start: 2024-12-02 | End: 2024-12-02

## 2024-12-02 RX ORDER — SCOPOLAMINE 1 MG/3D
1 PATCH, EXTENDED RELEASE TRANSDERMAL ONCE
Status: DISCONTINUED | OUTPATIENT
Start: 2024-12-02 | End: 2024-12-03 | Stop reason: HOSPADM

## 2024-12-02 RX ORDER — MIDAZOLAM HYDROCHLORIDE 1 MG/ML
INJECTION INTRAMUSCULAR; INTRAVENOUS
Status: DISCONTINUED | OUTPATIENT
Start: 2024-12-02 | End: 2024-12-02

## 2024-12-02 RX ORDER — SUCCINYLCHOLINE CHLORIDE 20 MG/ML
INJECTION INTRAMUSCULAR; INTRAVENOUS
Status: DISCONTINUED | OUTPATIENT
Start: 2024-12-02 | End: 2024-12-02

## 2024-12-02 RX ORDER — HYDROCODONE BITARTRATE AND ACETAMINOPHEN 10; 325 MG/1; MG/1
1 TABLET ORAL EVERY 4 HOURS PRN
Status: DISCONTINUED | OUTPATIENT
Start: 2024-12-02 | End: 2024-12-03 | Stop reason: HOSPADM

## 2024-12-02 RX ORDER — VALSARTAN 40 MG/1
40 TABLET ORAL DAILY
Status: DISCONTINUED | OUTPATIENT
Start: 2024-12-02 | End: 2024-12-03 | Stop reason: HOSPADM

## 2024-12-02 RX ORDER — SERTRALINE HYDROCHLORIDE 50 MG/1
50 TABLET, FILM COATED ORAL DAILY
Status: DISCONTINUED | OUTPATIENT
Start: 2024-12-03 | End: 2024-12-03 | Stop reason: HOSPADM

## 2024-12-02 RX ORDER — DEXAMETHASONE SODIUM PHOSPHATE 4 MG/ML
INJECTION, SOLUTION INTRA-ARTICULAR; INTRALESIONAL; INTRAMUSCULAR; INTRAVENOUS; SOFT TISSUE
Status: DISCONTINUED | OUTPATIENT
Start: 2024-12-02 | End: 2024-12-02

## 2024-12-02 RX ORDER — PANTOPRAZOLE SODIUM 40 MG/1
40 TABLET, DELAYED RELEASE ORAL DAILY
Status: DISCONTINUED | OUTPATIENT
Start: 2024-12-03 | End: 2024-12-03 | Stop reason: HOSPADM

## 2024-12-02 RX ORDER — OXYCODONE AND ACETAMINOPHEN 5; 325 MG/1; MG/1
1 TABLET ORAL EVERY 4 HOURS PRN
Status: DISCONTINUED | OUTPATIENT
Start: 2024-12-02 | End: 2024-12-03 | Stop reason: HOSPADM

## 2024-12-02 RX ORDER — FENTANYL CITRATE 50 UG/ML
INJECTION, SOLUTION INTRAMUSCULAR; INTRAVENOUS
Status: DISCONTINUED | OUTPATIENT
Start: 2024-12-02 | End: 2024-12-02

## 2024-12-02 RX ORDER — BISACODYL 10 MG/1
10 SUPPOSITORY RECTAL DAILY
Status: DISCONTINUED | OUTPATIENT
Start: 2024-12-02 | End: 2024-12-03 | Stop reason: HOSPADM

## 2024-12-02 RX ORDER — HYDROMORPHONE HYDROCHLORIDE 1 MG/ML
1 INJECTION, SOLUTION INTRAMUSCULAR; INTRAVENOUS; SUBCUTANEOUS
Status: DISCONTINUED | OUTPATIENT
Start: 2024-12-02 | End: 2024-12-03 | Stop reason: HOSPADM

## 2024-12-02 RX ORDER — FENTANYL CITRATE 50 UG/ML
25 INJECTION, SOLUTION INTRAMUSCULAR; INTRAVENOUS EVERY 5 MIN PRN
Status: DISCONTINUED | OUTPATIENT
Start: 2024-12-02 | End: 2024-12-02 | Stop reason: HOSPADM

## 2024-12-02 RX ORDER — PROPOFOL 10 MG/ML
VIAL (ML) INTRAVENOUS
Status: DISCONTINUED | OUTPATIENT
Start: 2024-12-02 | End: 2024-12-02

## 2024-12-02 RX ORDER — ONDANSETRON 8 MG/1
8 TABLET, ORALLY DISINTEGRATING ORAL EVERY 6 HOURS PRN
Status: DISCONTINUED | OUTPATIENT
Start: 2024-12-02 | End: 2024-12-03 | Stop reason: HOSPADM

## 2024-12-02 RX ORDER — HYDROMORPHONE HYDROCHLORIDE 1 MG/ML
0.2 INJECTION, SOLUTION INTRAMUSCULAR; INTRAVENOUS; SUBCUTANEOUS EVERY 5 MIN PRN
Status: DISCONTINUED | OUTPATIENT
Start: 2024-12-02 | End: 2024-12-02 | Stop reason: HOSPADM

## 2024-12-02 RX ORDER — ONDANSETRON HYDROCHLORIDE 2 MG/ML
4 INJECTION, SOLUTION INTRAVENOUS ONCE AS NEEDED
Status: DISCONTINUED | OUTPATIENT
Start: 2024-12-02 | End: 2024-12-02 | Stop reason: HOSPADM

## 2024-12-02 RX ORDER — VANCOMYCIN HYDROCHLORIDE 1 G/20ML
INJECTION, POWDER, LYOPHILIZED, FOR SOLUTION INTRAVENOUS
Status: DISCONTINUED | OUTPATIENT
Start: 2024-12-02 | End: 2024-12-02 | Stop reason: HOSPADM

## 2024-12-02 RX ORDER — SODIUM CHLORIDE 9 MG/ML
INJECTION, SOLUTION INTRAVENOUS CONTINUOUS
Status: DISCONTINUED | OUTPATIENT
Start: 2024-12-02 | End: 2024-12-03

## 2024-12-02 RX ORDER — OXYCODONE AND ACETAMINOPHEN 5; 325 MG/1; MG/1
1 TABLET ORAL
Status: DISCONTINUED | OUTPATIENT
Start: 2024-12-02 | End: 2024-12-02 | Stop reason: HOSPADM

## 2024-12-02 RX ORDER — DIAZEPAM 5 MG/1
5 TABLET ORAL EVERY 6 HOURS PRN
Status: DISCONTINUED | OUTPATIENT
Start: 2024-12-02 | End: 2024-12-03 | Stop reason: HOSPADM

## 2024-12-02 RX ORDER — ZOLPIDEM TARTRATE 5 MG/1
5 TABLET ORAL NIGHTLY PRN
Status: DISCONTINUED | OUTPATIENT
Start: 2024-12-02 | End: 2024-12-03 | Stop reason: HOSPADM

## 2024-12-02 RX ORDER — OXYCODONE HYDROCHLORIDE 5 MG/1
15 TABLET ORAL EVERY 4 HOURS PRN
Status: DISCONTINUED | OUTPATIENT
Start: 2024-12-02 | End: 2024-12-03 | Stop reason: HOSPADM

## 2024-12-02 RX ORDER — ALUMINUM HYDROXIDE, MAGNESIUM HYDROXIDE, AND SIMETHICONE 1200; 120; 1200 MG/30ML; MG/30ML; MG/30ML
30 SUSPENSION ORAL EVERY 4 HOURS PRN
Status: DISCONTINUED | OUTPATIENT
Start: 2024-12-02 | End: 2024-12-03 | Stop reason: HOSPADM

## 2024-12-02 RX ORDER — PHENYLEPHRINE HYDROCHLORIDE 10 MG/ML
INJECTION INTRAVENOUS
Status: DISCONTINUED | OUTPATIENT
Start: 2024-12-02 | End: 2024-12-02

## 2024-12-02 RX ORDER — AMOXICILLIN 250 MG
2 CAPSULE ORAL NIGHTLY PRN
Status: DISCONTINUED | OUTPATIENT
Start: 2024-12-02 | End: 2024-12-03 | Stop reason: HOSPADM

## 2024-12-02 RX ORDER — PROPOFOL 10 MG/ML
VIAL (ML) INTRAVENOUS CONTINUOUS PRN
Status: DISCONTINUED | OUTPATIENT
Start: 2024-12-02 | End: 2024-12-02

## 2024-12-02 RX ORDER — ATORVASTATIN CALCIUM 40 MG/1
80 TABLET, FILM COATED ORAL DAILY
Status: DISCONTINUED | OUTPATIENT
Start: 2024-12-02 | End: 2024-12-03 | Stop reason: HOSPADM

## 2024-12-02 RX ORDER — DIPHENHYDRAMINE HCL 25 MG
50 CAPSULE ORAL EVERY 6 HOURS PRN
Status: DISCONTINUED | OUTPATIENT
Start: 2024-12-02 | End: 2024-12-03 | Stop reason: HOSPADM

## 2024-12-02 RX ORDER — METOCLOPRAMIDE HYDROCHLORIDE 5 MG/ML
10 INJECTION INTRAMUSCULAR; INTRAVENOUS ONCE
Status: DISCONTINUED | OUTPATIENT
Start: 2024-12-02 | End: 2024-12-02 | Stop reason: HOSPADM

## 2024-12-02 RX ADMIN — ROCURONIUM BROMIDE 5 MG: 10 INJECTION, SOLUTION INTRAVENOUS at 07:12

## 2024-12-02 RX ADMIN — HYDROMORPHONE HYDROCHLORIDE 0.2 MG: 1 INJECTION, SOLUTION INTRAMUSCULAR; INTRAVENOUS; SUBCUTANEOUS at 12:12

## 2024-12-02 RX ADMIN — LIDOCAINE HYDROCHLORIDE 100 MG: 20 INJECTION INTRAVENOUS at 07:12

## 2024-12-02 RX ADMIN — SODIUM CHLORIDE: 9 INJECTION, SOLUTION INTRAVENOUS at 01:12

## 2024-12-02 RX ADMIN — SUCCINYLCHOLINE CHLORIDE 160 MG: 20 INJECTION, SOLUTION INTRAMUSCULAR; INTRAVENOUS; PARENTERAL at 07:12

## 2024-12-02 RX ADMIN — PROPOFOL 150 MCG/KG/MIN: 10 INJECTION, EMULSION INTRAVENOUS at 07:12

## 2024-12-02 RX ADMIN — VANCOMYCIN HYDROCHLORIDE 1000 MG: 1 INJECTION, POWDER, LYOPHILIZED, FOR SOLUTION INTRAVENOUS at 08:12

## 2024-12-02 RX ADMIN — ATORVASTATIN CALCIUM 80 MG: 40 TABLET, FILM COATED ORAL at 01:12

## 2024-12-02 RX ADMIN — CEFAZOLIN 2 G: 330 INJECTION, POWDER, FOR SOLUTION INTRAMUSCULAR; INTRAVENOUS at 07:12

## 2024-12-02 RX ADMIN — PROPOFOL 30 MG: 10 INJECTION, EMULSION INTRAVENOUS at 11:12

## 2024-12-02 RX ADMIN — REMIFENTANIL HYDROCHLORIDE 0.1 MCG/KG/MIN: 1 INJECTION, POWDER, LYOPHILIZED, FOR SOLUTION INTRAVENOUS at 07:12

## 2024-12-02 RX ADMIN — THERA TABS 1 TABLET: TAB at 01:12

## 2024-12-02 RX ADMIN — GLYCOPYRROLATE 0.4 MG: 0.2 INJECTION, SOLUTION INTRAMUSCULAR; INTRAVENOUS at 07:12

## 2024-12-02 RX ADMIN — PROPOFOL 150 MG: 10 INJECTION, EMULSION INTRAVENOUS at 07:12

## 2024-12-02 RX ADMIN — MIDAZOLAM HYDROCHLORIDE 2 MG: 1 INJECTION, SOLUTION INTRAMUSCULAR; INTRAVENOUS at 07:12

## 2024-12-02 RX ADMIN — HYDROMORPHONE HYDROCHLORIDE 2 MG: 1 INJECTION, SOLUTION INTRAMUSCULAR; INTRAVENOUS; SUBCUTANEOUS at 02:12

## 2024-12-02 RX ADMIN — SODIUM CHLORIDE, SODIUM LACTATE, POTASSIUM CHLORIDE, AND CALCIUM CHLORIDE: .6; .31; .03; .02 INJECTION, SOLUTION INTRAVENOUS at 07:12

## 2024-12-02 RX ADMIN — ROCURONIUM BROMIDE 10 MG: 10 INJECTION, SOLUTION INTRAVENOUS at 07:12

## 2024-12-02 RX ADMIN — PHENYLEPHRINE HYDROCHLORIDE 200 MCG: 10 INJECTION INTRAVENOUS at 07:12

## 2024-12-02 RX ADMIN — SCOPOLAMINE 1 PATCH: 1.5 PATCH, EXTENDED RELEASE TRANSDERMAL at 06:12

## 2024-12-02 RX ADMIN — FENTANYL CITRATE 75 MCG: 50 INJECTION, SOLUTION INTRAMUSCULAR; INTRAVENOUS at 11:12

## 2024-12-02 RX ADMIN — MEPERIDINE HYDROCHLORIDE 12.5 MG: 25 INJECTION INTRAMUSCULAR; INTRAVENOUS; SUBCUTANEOUS at 12:12

## 2024-12-02 RX ADMIN — OXYCODONE HYDROCHLORIDE 15 MG: 5 TABLET ORAL at 01:12

## 2024-12-02 RX ADMIN — HYDROMORPHONE HYDROCHLORIDE 0.2 MG: 1 INJECTION, SOLUTION INTRAMUSCULAR; INTRAVENOUS; SUBCUTANEOUS at 11:12

## 2024-12-02 RX ADMIN — CEFTRIAXONE SODIUM 1 G: 1 INJECTION, POWDER, FOR SOLUTION INTRAMUSCULAR; INTRAVENOUS at 09:12

## 2024-12-02 RX ADMIN — FENTANYL CITRATE 25 MCG: 50 INJECTION, SOLUTION INTRAMUSCULAR; INTRAVENOUS at 11:12

## 2024-12-02 RX ADMIN — HYDROMORPHONE HYDROCHLORIDE 2 MG: 1 INJECTION, SOLUTION INTRAMUSCULAR; INTRAVENOUS; SUBCUTANEOUS at 10:12

## 2024-12-02 RX ADMIN — OXYCODONE HYDROCHLORIDE 15 MG: 5 TABLET ORAL at 08:12

## 2024-12-02 RX ADMIN — GABAPENTIN 600 MG: 300 CAPSULE ORAL at 08:12

## 2024-12-02 RX ADMIN — VALSARTAN 40 MG: 40 TABLET, FILM COATED ORAL at 01:12

## 2024-12-02 RX ADMIN — DOCUSATE SODIUM 100 MG: 100 CAPSULE, LIQUID FILLED ORAL at 01:12

## 2024-12-02 RX ADMIN — DEXAMETHASONE SODIUM PHOSPHATE 4 MG: 4 INJECTION, SOLUTION INTRA-ARTICULAR; INTRALESIONAL; INTRAMUSCULAR; INTRAVENOUS; SOFT TISSUE at 11:12

## 2024-12-02 RX ADMIN — PROPOFOL 20 MG: 10 INJECTION, EMULSION INTRAVENOUS at 11:12

## 2024-12-02 RX ADMIN — ONDANSETRON 4 MG: 2 INJECTION INTRAMUSCULAR; INTRAVENOUS at 11:12

## 2024-12-02 NOTE — PLAN OF CARE
Discussed poc with pt, pt verbalized understanding  Purposeful rounding every 2hours  VS wnl  Fall precautions in place, remains injury free  Pain under control with PRN meds  IVFs  Accurate I&Os  Bed locked at lowest position  Call light within reach  Chart check complete  Will cont with POC

## 2024-12-02 NOTE — HPI
Ms. Franco is a 64-year-old female with past medical history of hypertension, hyperlipidemia, sleep apnea, and anxiety presented for an elective ACDF with Dr. Bull on 12/02/2024.  Hospital medicine was consulted postprocedure for medical management.  Patient was seen and examined with  at bedside.  She states that her pain is fairly well-controlled and she has no trouble with swallowing the clear liquids that she was on.  Patient's voice does not appear to be hoarse and  at bedside states that she sounds normal to him.  Patient has a Hemovac in place with minimal output.  Hospital medicine will assist in managing chronic medical issues.

## 2024-12-02 NOTE — TRANSFER OF CARE
"Anesthesia Transfer of Care Note    Patient: Elina Franco    Procedure(s) Performed: Procedure(s) (LRB):  FUSION, SPINE, CERVICAL, ANTERIOR APPROACH (N/A)  DECOMPRESSION AND FUSION, SPINE, CERVICAL, ANTERIOR APPROACH (N/A)    Patient location: PACU    Anesthesia Type: general    Transport from OR: Transported from OR on room air with adequate spontaneous ventilation    Post pain: adequate analgesia    Post assessment: no apparent anesthetic complications    Post vital signs: stable    Level of consciousness: sedated    Nausea/Vomiting: no nausea/vomiting    Complications: none    Transfer of care protocol was followed      Last vitals: Visit Vitals  /71 (BP Location: Right arm, Patient Position: Sitting)   Pulse 82   Temp 36.6 °C (97.9 °F) (Temporal)   Resp 18   Ht 5' 6" (1.676 m)   Wt 71.8 kg (158 lb 4.6 oz)   SpO2 97%   Breastfeeding No   BMI 25.55 kg/m²     "

## 2024-12-02 NOTE — CONSULTS
[facility] Medicine  Consult Note    Patient Name: Elina Franco  MRN: [id number]  Admission Date: 12/2/2024  Hospital Length of Stay: 0 days  Attending Physician: Solis Morales MD   Primary Care Provider: Renetta Monaco MD           Patient information was obtained from patient, spouse/SO, and ER records.     Consults  Subjective:     Principal Problem: Cervical spine stenosis    Chief Complaint: Neck pain       HPI: Ms. Franco is a 64-year-old female with past medical history of hypertension, hyperlipidemia, sleep apnea, and anxiety presented for an elective ACDF with Dr. Bull on 12/02/2024.  Hospital medicine was consulted postprocedure for medical management.  Patient was seen and examined with  at bedside.  She states that her pain is fairly well-controlled and she has no trouble with swallowing the clear liquids that she was on.  Patient's voice does not appear to be hoarse and  at bedside states that she sounds normal to him.  Patient has a Hemovac in place with minimal output.  Hospital medicine will assist in managing chronic medical issues.    Past Medical History:   Diagnosis Date    Anxiety     Hyperlipidemia     Hypertension     Sleep apnea        Past Surgical History:   Procedure Laterality Date    BUNIONECTOMY Left 2018    L great toe and side bunions    CARPAL TUNNEL RELEASE Bilateral     2000    CHOLECYSTECTOMY  2006    EPIDURAL STEROID INJECTION INTO CERVICAL SPINE N/A 02/21/2024    Procedure: C6/7 IL JOJO;  Surgeon: Fish Claudio MD;  Location: [facility] PAIN [facility]GT;  Service: Pain Management;  Laterality: N/A;    EPIDURAL STEROID INJECTION INTO CERVICAL SPINE N/A 06/12/2024    Procedure: C6/7 IL JOJO;  Surgeon: Fish Claudio MD;  Location: [facility] PAIN [facility]GT;  Service: Pain Management;  Laterality: N/A;    HYSTERECTOMY  1998       Review of patient's allergies indicates:   Allergen Reactions    Statins-hmg-coa reductase inhibitors Swelling and Other (See  Comments)     Lower extremity cramp with pravastatin in 2016.    Sulfa (sulfonamide antibiotics)        No current facility-administered medications on file prior to encounter.     Current Outpatient Medications on File Prior to Encounter   Medication Sig    gabapentin (NEURONTIN) 300 MG capsule Take 600 mg by mouth 2 (two) times daily.    irbesartan (AVAPRO) 75 MG tablet Take 75 mg by mouth every evening.    omega-3 fatty acids/fish oil (FISH OIL-OMEGA-3 FATTY ACIDS) 300-1,000 mg capsule Take by mouth once daily.    rosuvastatin (CRESTOR) 40 MG Tab Take 10 mg by mouth every evening.    sertraline (ZOLOFT) 50 MG tablet Take 50 mg by mouth once daily.    zolpidem (AMBIEN) 10 mg Tab Take 5 mg by mouth nightly as needed.    traMADoL (ULTRAM) 50 mg tablet Take 1 tablet (50 mg total) by mouth every 6 (six) hours as needed for Pain. (Patient not taking: Reported on 9/19/2024)     Family History       Problem Relation (Age of Onset)    Cancer Father          Tobacco Use    Smoking status: Never    Smokeless tobacco: Never   Substance and Sexual Activity    Alcohol use: Not Currently     Comment: rarely    Drug use: Not Currently    Sexual activity: Yes     Review of Systems   Constitutional:  Negative for activity change, appetite change and fatigue.   Respiratory:  Negative for cough, chest tightness and shortness of breath.    Cardiovascular:  Negative for chest pain, palpitations and leg swelling.   Gastrointestinal:  Negative for abdominal pain, nausea and vomiting.   Musculoskeletal:  Positive for myalgias and neck pain. Negative for gait problem.   Neurological:  Negative for dizziness, weakness, light-headedness and headaches.   Psychiatric/Behavioral:  Negative for agitation and confusion. The patient is not nervous/anxious.    All other systems reviewed and are negative.    Objective:     Vital Signs (Most Recent):  Temp: 97.5 °F (36.4 °C) (12/02/24 1625)  Pulse: 92 (12/02/24 1625)  Resp: 17 (12/02/24 1625)  BP:  (!) 126/59 (12/02/24 1625)  SpO2: (!) 91 % (12/02/24 1625) Vital Signs (24h Range):  Temp:  [96.9 °F (36.1 °C)-97.9 °F (36.6 °C)] 97.5 °F (36.4 °C)  Pulse:  [] 92  Resp:  [10-18] 17  SpO2:  [90 %-97 %] 91 %  BP: (119-151)/(59-87) 126/59     Weight: 71.8 kg (158 lb 4.6 oz)  Body mass index is 25.55 kg/m².     Physical Exam  Vitals and nursing note reviewed.   Constitutional:       Appearance: Normal appearance.   HENT:      Head: Normocephalic and atraumatic.      Nose: Nose normal.      Mouth/Throat:      Mouth: Mucous membranes are moist.   Eyes:      Extraocular Movements: Extraocular movements intact.      Conjunctiva/sclera: Conjunctivae normal.   Cardiovascular:      Rate and Rhythm: Normal rate and regular rhythm.      Pulses: Normal pulses.      Heart sounds: Normal heart sounds.   Pulmonary:      Effort: Pulmonary effort is normal.      Breath sounds: Normal breath sounds.   Abdominal:      General: Abdomen is flat. Bowel sounds are normal.      Palpations: Abdomen is soft.   Musculoskeletal:         General: Normal range of motion.      Cervical back: Normal range of motion and neck supple.      Comments: Neck brace in place with Hemovac anteriorly.   Skin:     General: Skin is warm.      Capillary Refill: Capillary refill takes less than 2 seconds.   Neurological:      Mental Status: She is alert and oriented to person, place, and time. Mental status is at baseline.   Psychiatric:         Mood and Affect: Mood normal.         Behavior: Behavior normal.          Significant Labs: All pertinent labs within the past 24 hours have been reviewed.  Recent Lab Results         12/02/24  1200   12/02/24  0620   12/02/24  0600        Anion Gap 8           Baso # 0.01           Basophil % 0.2           BUN 12           Calcium 8.9           Chloride 111           CO2 22           Creatinine 0.8           Differential Method Automated           eGFR >60           Eos # 0.2           Eos % 2.3           Glucose  110           Gran # (ANC) 3.6           Gran % 54.0           Group & Rh     O POS       Hematocrit 36.0           Hemoglobin 12.2           Immature Grans (Abs) 0.02  Comment: Mild elevation in immature granulocytes is non specific and   can be seen in a variety of conditions including stress response,   acute inflammation, trauma and pregnancy. Correlation with other   laboratory and clinical findings is essential.             Immature Granulocytes 0.3           INDIRECT GRAYSON     NEG       Lymph # 2.4           Lymph % 35.7           MCH 30.4           MCHC 33.9           MCV 90           Mono # 0.5           Mono % 7.5           MPV 11.1           nRBC 0           Platelet Count 159           POCT Glucose   87         Potassium 3.8           RBC 4.01           RDW 13.0           Sodium 141           WBC 6.64                   Significant Imaging:   SURG FL Surgery Fluoro Usage   Final Result      X-Ray Cervical Spine AP And Lateral   Final Result      Fluoroscopic guidance utilized for ACDF please see procedural dictation for further details.         Electronically signed by: Sushil Wing   Date:    12/02/2024   Time:    12:12      X-Ray Cervical Spine AP And Lateral    (Results Pending)      Assessment/Plan:     Cervical stenosis of spinal canal  -Dr. Morales performed an ACDF on to 12/02/2024  -pain well-controlled   -no issues with swallowing or hoarseness  -remainder of care per primary team        Primary hypertension  Patient's blood pressure range in the last 24 hours was: BP  Min: 119/71  Max: 151/83.The patient's inpatient anti-hypertensive regimen is listed below:  Current Antihypertensives  valsartan tablet 40 mg, Daily, Oral    Plan  - BP is controlled, no changes needed to their regimen  - continue home meds    Mixed hyperlipidemia  -Continue home Avapro and Crestor   -resume fish oil on discharge      Anxiety  -continue home meds        VTE Risk Mitigation (From admission, onward)            Ordered     IP VTE HIGH RISK PATIENT  Once         12/02/24 1312     Place sequential compression device  Until discontinued         12/02/24 1312                        Thank you for your consult. I will follow-up with patient. Please contact us if you have any additional questions.    Aliya Reyes MD  Department of Hospital Medicine   [facility] - Med Surg

## 2024-12-02 NOTE — SUBJECTIVE & OBJECTIVE
Past Medical History:   Diagnosis Date    Anxiety     Hyperlipidemia     Hypertension     Sleep apnea        Past Surgical History:   Procedure Laterality Date    BUNIONECTOMY Left 2018    L great toe and side bunions    CARPAL TUNNEL RELEASE Bilateral     2000    CHOLECYSTECTOMY  2006    EPIDURAL STEROID INJECTION INTO CERVICAL SPINE N/A 02/21/2024    Procedure: C6/7 IL JOJO;  Surgeon: Fish Claudio MD;  Location: [facility] PAIN MGT;  Service: Pain Management;  Laterality: N/A;    EPIDURAL STEROID INJECTION INTO CERVICAL SPINE N/A 06/12/2024    Procedure: C6/7 IL JOJO;  Surgeon: Fish Claudio MD;  Location: [facility] PAIN MGT;  Service: Pain Management;  Laterality: N/A;    HYSTERECTOMY  1998       Review of patient's allergies indicates:   Allergen Reactions    Statins-hmg-coa reductase inhibitors Swelling and Other (See Comments)     Lower extremity cramp with pravastatin in 2016.    Sulfa (sulfonamide antibiotics)        No current facility-administered medications on file prior to encounter.     Current Outpatient Medications on File Prior to Encounter   Medication Sig    gabapentin (NEURONTIN) 300 MG capsule Take 600 mg by mouth 2 (two) times daily.    irbesartan (AVAPRO) 75 MG tablet Take 75 mg by mouth every evening.    omega-3 fatty acids/fish oil (FISH OIL-OMEGA-3 FATTY ACIDS) 300-1,000 mg capsule Take by mouth once daily.    rosuvastatin (CRESTOR) 40 MG Tab Take 10 mg by mouth every evening.    sertraline (ZOLOFT) 50 MG tablet Take 50 mg by mouth once daily.    zolpidem (AMBIEN) 10 mg Tab Take 5 mg by mouth nightly as needed.    traMADoL (ULTRAM) 50 mg tablet Take 1 tablet (50 mg total) by mouth every 6 (six) hours as needed for Pain. (Patient not taking: Reported on 9/19/2024)     Family History       Problem Relation (Age of Onset)    Cancer Father          Tobacco Use    Smoking status: Never    Smokeless tobacco: Never   Substance and Sexual Activity    Alcohol use: Not Currently     Comment: rarely     Drug use: Not Currently    Sexual activity: Yes     Review of Systems   Constitutional:  Negative for activity change, appetite change and fatigue.   Respiratory:  Negative for cough, chest tightness and shortness of breath.    Cardiovascular:  Negative for chest pain, palpitations and leg swelling.   Gastrointestinal:  Negative for abdominal pain, nausea and vomiting.   Musculoskeletal:  Positive for myalgias and neck pain. Negative for gait problem.   Neurological:  Negative for dizziness, weakness, light-headedness and headaches.   Psychiatric/Behavioral:  Negative for agitation and confusion. The patient is not nervous/anxious.    All other systems reviewed and are negative.    Objective:     Vital Signs (Most Recent):  Temp: 97.5 °F (36.4 °C) (12/02/24 1625)  Pulse: 92 (12/02/24 1625)  Resp: 17 (12/02/24 1625)  BP: (!) 126/59 (12/02/24 1625)  SpO2: (!) 91 % (12/02/24 1625) Vital Signs (24h Range):  Temp:  [96.9 °F (36.1 °C)-97.9 °F (36.6 °C)] 97.5 °F (36.4 °C)  Pulse:  [] 92  Resp:  [10-18] 17  SpO2:  [90 %-97 %] 91 %  BP: (119-151)/(59-87) 126/59     Weight: 71.8 kg (158 lb 4.6 oz)  Body mass index is 25.55 kg/m².     Physical Exam  Vitals and nursing note reviewed.   Constitutional:       Appearance: Normal appearance.   HENT:      Head: Normocephalic and atraumatic.      Nose: Nose normal.      Mouth/Throat:      Mouth: Mucous membranes are moist.   Eyes:      Extraocular Movements: Extraocular movements intact.      Conjunctiva/sclera: Conjunctivae normal.   Cardiovascular:      Rate and Rhythm: Normal rate and regular rhythm.      Pulses: Normal pulses.      Heart sounds: Normal heart sounds.   Pulmonary:      Effort: Pulmonary effort is normal.      Breath sounds: Normal breath sounds.   Abdominal:      General: Abdomen is flat. Bowel sounds are normal.      Palpations: Abdomen is soft.   Musculoskeletal:         General: Normal range of motion.      Cervical back: Normal range of motion and neck  supple.      Comments: Neck brace in place with Hemovac anteriorly.   Skin:     General: Skin is warm.      Capillary Refill: Capillary refill takes less than 2 seconds.   Neurological:      Mental Status: She is alert and oriented to person, place, and time. Mental status is at baseline.   Psychiatric:         Mood and Affect: Mood normal.         Behavior: Behavior normal.          Significant Labs: All pertinent labs within the past 24 hours have been reviewed.  Recent Lab Results         12/02/24  1200   12/02/24  0620   12/02/24  0600        Anion Gap 8           Baso # 0.01           Basophil % 0.2           BUN 12           Calcium 8.9           Chloride 111           CO2 22           Creatinine 0.8           Differential Method Automated           eGFR >60           Eos # 0.2           Eos % 2.3           Glucose 110           Gran # (ANC) 3.6           Gran % 54.0           Group & Rh     O POS       Hematocrit 36.0           Hemoglobin 12.2           Immature Grans (Abs) 0.02  Comment: Mild elevation in immature granulocytes is non specific and   can be seen in a variety of conditions including stress response,   acute inflammation, trauma and pregnancy. Correlation with other   laboratory and clinical findings is essential.             Immature Granulocytes 0.3           INDIRECT GRAYSON     NEG       Lymph # 2.4           Lymph % 35.7           MCH 30.4           MCHC 33.9           MCV 90           Mono # 0.5           Mono % 7.5           MPV 11.1           nRBC 0           Platelet Count 159           POCT Glucose   87         Potassium 3.8           RBC 4.01           RDW 13.0           Sodium 141           WBC 6.64                   Significant Imaging:   SURG FL Surgery Fluoro Usage   Final Result      X-Ray Cervical Spine AP And Lateral   Final Result      Fluoroscopic guidance utilized for ACDF please see procedural dictation for further details.         Electronically signed by: Sushil Wing    Date:    12/02/2024   Time:    12:12      X-Ray Cervical Spine AP And Lateral    (Results Pending)

## 2024-12-02 NOTE — ANESTHESIA PROCEDURE NOTES
Intubation    Date/Time: 12/2/2024 7:11 AM    Performed by: Mina Ritter CRNA  Authorized by: Matthew Lane MD    Intubation:     Induction:  Intravenous    Intubated:  Postinduction    Mask Ventilation:  Easy mask    Attempts:  1    Attempted By:  CRNA    Method of Intubation:  Video laryngoscopy    Blade:  Barnes 3    Laryngeal View Grade: Grade I - full view of cords      Difficult Airway Encountered?: No      Complications:  None    Airway Device:  EMG ETT (NIMS)    Airway Device Size:  7.0    Style/Cuff Inflation:  Cuffed (inflated to minimal occlusive pressure)    Tube secured:  21    Secured at:  The lips    Placement Verified By:  Capnometry    Complicating Factors:  None    Findings Post-Intubation:  BS equal bilateral and atraumatic/condition of teeth unchanged

## 2024-12-02 NOTE — H&P
Subjective:        To OR this AM for ACDF   Consents signed     The patient was informed of all benefits and potential risk of the operation including but not limited to:  Pain, infection, bleeding, coma, paralysis, death.  Cerebrospinal fluid leak, failure of any instrumentation, the need for additional procedures in the future. No guarantee was given that this procedure would alleviate all of the symptoms.        Solis Morales MD  Neurosurgery     Disclaimer: This note was prepared using a voice recognition system and is likely to have sound alike errors within the text.                    Patient ID: Elina Franco is a 64 y.o. female.    Chief Complaint: No chief complaint on file.    Patient is here today for follow-up for her cervical spine after having an interlaminar injection  She states that the injections did help for a little while however her symptoms returned  Rates her neck pain is 6/10 today  Symptoms distal to the biceps bilaterally    We went over her MRI as well as her x-rays of cervical spine    These were at the imaging Center [facility] and we have them accessed on the imaging portal      Review of Systems   Constitutional:  Negative for activity change, appetite change and chills.   HENT:  Negative for congestion and ear pain.    Eyes:  Negative for photophobia, redness and visual disturbance.   Respiratory:  Negative for apnea and chest tightness.    Cardiovascular:  Negative for chest pain.   Gastrointestinal:  Negative for abdominal distention and abdominal pain.   Endocrine: Negative for cold intolerance.   Genitourinary:  Negative for difficulty urinating.   Musculoskeletal:  Positive for myalgias, neck pain and neck stiffness. Negative for arthralgias.   Skin:  Negative for color change.   Allergic/Immunologic: Negative for environmental allergies.   Neurological:  Positive for weakness and numbness. Negative for dizziness.   Hematological:  Negative for adenopathy. Does not bruise/bleed  easily.   Psychiatric/Behavioral:  Negative for agitation, behavioral problems and confusion.          Objective:       Physical Exam:  Nursing note and vitals reviewed.    Constitutional: She appears well-nourished. No distress.     Eyes: EOM are normal.     Cardiovascular: Normal rate.     Psych/Behavior: She is alert. She is oriented to person, place, and time. She has a normal mood and affect.     Musculoskeletal:        Neck: Range of motion is limited. There is tenderness. Muscle strength is 5/5.        Right Upper Extremities: There is no tenderness. Muscle strength is 5/5.        Left Upper Extremities: There is no tenderness. Muscle strength is 5/5.     Neurological:        Sensory: There is no sensory deficit in the trunk. There is no sensory deficit in the extremities.        Cranial nerves: Cranial nerve(s) II, III, IV, V, VI, VII, VIII, IX, X, XI and XII are intact.     General    Nursing note and vitals reviewed.  Constitutional: She is oriented to person, place, and time. She appears well-nourished. No distress.   Eyes: EOM are normal.   Cardiovascular:  Normal rate.            Neurological: She is alert and oriented to person, place, and time.   Psychiatric: She has a normal mood and affect.             Ortho Exam      MRI cervical spine shows degenerative disc disease worse at C4-5 and C5-6 there is also evidence of a posterior osteophyte at C6-7 causing mild foraminal stenosis bilaterally at each of these levels  There is mention of this being worse from prior imaging however I do not have the prior imaging to compare to    I  reviewed all pertinent imaging regarding this case.  Assessment:     No diagnosis found.    Plan:     There are no diagnoses linked to this encounter.    I have gone over the patient's imaging as well as the treatment options with her  She has persistent symptoms after physical therapy as well as injections with pain management  She has had an EMG which showed mild carpal  tunnel without any acute cervical radiculopathy    She is wishing to pursue surgical intervention I recommend an anterior cervical diskectomy and fusion C4-5 C5-6 and C6-7    I have discussed the procedure in detail with her  She states that this time she is currently expecting the birth of her grandchild in August and does not wish to consider surgical intervention until after that time    She will make follow-up appointment 2 months approximate before she wishes to proceed with signing surgical paperwork    She will have follow-up appointment through the portal    Her imaging will be pushed over into our PACS system  I provided her with a prescription for Ultram which she can take on an as-needed basis for      Thank you for the referral   Please call with any questions    Solis Morales MD  Neurosurgery     Disclaimer: This note was prepared using a voice recognition system and is likely to have sound alike errors within the text.      Thank you for the referral   Please call with any questions    Solis Morales MD  Neurosurgery     Disclaimer: This note was prepared using a voice recognition system and is likely to have sound alike errors within the text.

## 2024-12-02 NOTE — ASSESSMENT & PLAN NOTE
Patient's blood pressure range in the last 24 hours was: BP  Min: 119/71  Max: 151/83.The patient's inpatient anti-hypertensive regimen is listed below:  Current Antihypertensives  valsartan tablet 40 mg, Daily, Oral    Plan  - BP is controlled, no changes needed to their regimen  - continue home meds

## 2024-12-02 NOTE — ASSESSMENT & PLAN NOTE
-Dr. Morales performed an ACDF on to 12/02/2024  -pain well-controlled   -no issues with swallowing or hoarseness  -remainder of care per primary team

## 2024-12-03 VITALS
RESPIRATION RATE: 16 BRPM | DIASTOLIC BLOOD PRESSURE: 63 MMHG | OXYGEN SATURATION: 97 % | SYSTOLIC BLOOD PRESSURE: 138 MMHG | TEMPERATURE: 99 F | WEIGHT: 158.31 LBS | BODY MASS INDEX: 25.44 KG/M2 | HEART RATE: 79 BPM | HEIGHT: 66 IN

## 2024-12-03 PROCEDURE — 97530 THERAPEUTIC ACTIVITIES: CPT

## 2024-12-03 PROCEDURE — 97161 PT EVAL LOW COMPLEX 20 MIN: CPT

## 2024-12-03 PROCEDURE — 94799 UNLISTED PULMONARY SVC/PX: CPT | Mod: XB

## 2024-12-03 PROCEDURE — 97166 OT EVAL MOD COMPLEX 45 MIN: CPT

## 2024-12-03 PROCEDURE — 25000003 PHARM REV CODE 250: Performed by: PHYSICIAN ASSISTANT

## 2024-12-03 PROCEDURE — 63600175 PHARM REV CODE 636 W HCPCS: Performed by: PHYSICIAN ASSISTANT

## 2024-12-03 RX ORDER — HYDROCODONE BITARTRATE AND ACETAMINOPHEN 10; 325 MG/1; MG/1
1 TABLET ORAL EVERY 6 HOURS PRN
Qty: 28 TABLET | Refills: 0 | Status: SHIPPED | OUTPATIENT
Start: 2024-12-03 | End: 2024-12-06 | Stop reason: ALTCHOICE

## 2024-12-03 RX ORDER — DEXAMETHASONE SODIUM PHOSPHATE 4 MG/ML
6 INJECTION, SOLUTION INTRA-ARTICULAR; INTRALESIONAL; INTRAMUSCULAR; INTRAVENOUS; SOFT TISSUE ONCE
Status: COMPLETED | OUTPATIENT
Start: 2024-12-03 | End: 2024-12-03

## 2024-12-03 RX ORDER — TIZANIDINE 4 MG/1
4 TABLET ORAL EVERY 6 HOURS PRN
Qty: 30 TABLET | Refills: 0 | Status: SHIPPED | OUTPATIENT
Start: 2024-12-03 | End: 2024-12-13

## 2024-12-03 RX ADMIN — SERTRALINE HYDROCHLORIDE 50 MG: 50 TABLET ORAL at 09:12

## 2024-12-03 RX ADMIN — THERA TABS 1 TABLET: TAB at 09:12

## 2024-12-03 RX ADMIN — OXYCODONE HYDROCHLORIDE 15 MG: 5 TABLET ORAL at 09:12

## 2024-12-03 RX ADMIN — SODIUM CHLORIDE: 9 INJECTION, SOLUTION INTRAVENOUS at 05:12

## 2024-12-03 RX ADMIN — OXYCODONE HYDROCHLORIDE 15 MG: 5 TABLET ORAL at 05:12

## 2024-12-03 RX ADMIN — DIPHENHYDRAMINE HYDROCHLORIDE 50 MG: 25 CAPSULE ORAL at 03:12

## 2024-12-03 RX ADMIN — DEXAMETHASONE SODIUM PHOSPHATE 6 MG: 4 INJECTION INTRA-ARTICULAR; INTRALESIONAL; INTRAMUSCULAR; INTRAVENOUS; SOFT TISSUE at 11:12

## 2024-12-03 RX ADMIN — VALSARTAN 40 MG: 40 TABLET, FILM COATED ORAL at 09:12

## 2024-12-03 RX ADMIN — ATORVASTATIN CALCIUM 80 MG: 40 TABLET, FILM COATED ORAL at 09:12

## 2024-12-03 RX ADMIN — DOCUSATE SODIUM 100 MG: 100 CAPSULE, LIQUID FILLED ORAL at 09:12

## 2024-12-03 RX ADMIN — PANTOPRAZOLE SODIUM 40 MG: 40 TABLET, DELAYED RELEASE ORAL at 09:12

## 2024-12-03 RX ADMIN — GABAPENTIN 600 MG: 300 CAPSULE ORAL at 09:12

## 2024-12-03 NOTE — PLAN OF CARE
[facility]  Initial Discharge Assessment       Primary Care Provider: Renetta Monaco MD    Admission Diagnosis: Cervical stenosis of spinal canal [M48.02]  Post-operative state [Z98.890]    Admission Date: 12/2/2024  Expected Discharge Date: per attending         Payor: MEDICARE / Plan: MEDICARE PART A & B / Product Type: Government /     Extended Emergency Contact Information  Primary Emergency Contact: Shawn Franco  Address: [address] [place] [address]           [employer] [place] [address] [place]  Mobile Phone: [phone]  Relation: Spouse    Discharge Plan A: Home Health         [id number] - [place] - [address]  [address]   [facility] [address]  Phone: [phone] Fax: [phone]      Initial Assessment (most recent)       Adult Discharge Assessment - 12/03/24 1131          Discharge Assessment    Assessment Type Discharge Planning Assessment     Confirmed/corrected address, phone number and insurance Yes     Confirmed Demographics Correct on Facesheet     Source of Information patient     Communicated STANLEY with patient/caregiver Date not available/Unable to determine     Reason For Admission cervical stenosis of spine     People in Home spouse     Do you expect to return to your current living situation? Yes     Do you have help at home or someone to help you manage your care at home? Yes     Who are your caregiver(s) and their phone number(s)? hh     Prior to hospitilization cognitive status: Alert/Oriented     Current cognitive status: Alert/Oriented     Walking or Climbing Stairs Difficulty no     Dressing/Bathing Difficulty no     Equipment Currently Used at Home none     Readmission within 30 days? No     Patient currently being followed by outpatient case management? No     Do you currently have service(s) that help you manage your care at home? No     Do you take prescription medications? Yes     Do you have prescription coverage? Yes     Coverage  medicare     Do you have any problems affording any of your prescribed medications? No     Is the patient taking medications as prescribed? yes     Who is going to help you get home at discharge? spouse     How do you get to doctors appointments? car, drives self;family or friend will provide     Are you on dialysis? No     Do you take coumadin? No     Discharge Plan A Home Health

## 2024-12-03 NOTE — PT/OT/SLP EVAL
Physical Therapy Evaluation    Patient Name:  Elina Franco   MRN:  [id number]    Recommendations:     Discharge Recommendations: Low Intensity Therapy   Discharge Equipment Recommendations: walker, rolling   Barriers to discharge: None    Assessment:     Elina Franco is a 64 y.o. female admitted with a medical diagnosis of <principal problem not specified>.  She presents with the following impairments/functional limitations: weakness, impaired endurance, impaired self care skills, impaired functional mobility, gait instability, impaired balance, pain, decreased upper extremity function .    Rehab Prognosis: Good; patient would benefit from acute skilled PT services to address these deficits and reach maximum level of function.    Recent Surgery: Procedure(s) (LRB):  FUSION, SPINE, CERVICAL, ANTERIOR APPROACH (N/A)  DECOMPRESSION AND FUSION, SPINE, CERVICAL, ANTERIOR APPROACH (N/A) 1 Day Post-Op    Plan:     During this hospitalization, patient to be seen 3 x/week to address the identified rehab impairments via gait training, therapeutic activities, therapeutic exercises and progress toward the following goals:    Plan of Care Expires:  12/17/24    Subjective     Chief Complaint: NECK PAIN 6./10   Patient/Family Comments/goals: INC FUNC MOBILITY   Pain/Comfort:  Pain Rating 1: 6/10  Location 1: neck  Pain Rating Post-Intervention 1: 6/10    Patients cultural, spiritual, Lutheran conflicts given the current situation:      Living Environment:   PT LIVES AT HOME WITH  IN A 2 STORY APT WITH NO STEPS AS SHE HAS GARAGE ACCESS TO HOME   Prior to admission, patients level of function was IND . DRIVES AND WROKS.  Equipment used at home: none.  DME owned (not currently used): none.  Upon discharge, patient will have assistance from  .    Objective:     Communicated with NURSE BERENICE AND EPIC CHART REVIEW  prior to session.  Patient found supine with peripheral IV, hemovac, cervical collar  upon PT entry to  "room.    General Precautions: Standard, fall  Orthopedic Precautions:spinal precautions   Braces: Aspen collar  Respiratory Status: Room air    Exams:  Cognitive Exam:  Patient is oriented to Person, Place, Time, and Situation  Sensation:    -       Impaired  TINGLING IN B FEET  RLE ROM: WFL  RLE Strength: WFL  LLE ROM: WFL  LLE Strength: WFL    Functional Mobility:  Bed Mobility:     Rolling Right: stand by assistance  Scooting: stand by assistance  Supine to Sit: stand by assistance  Transfers:     Sit to Stand:  contact guard assistance with no AD  Bed to Chair: contact guard assistance with  no AD  using  Stand Pivot  Gait: PT GT TRAINED X 100' WITH HHA WITH CONT CUES TO KEEP EYES OPEN.       AM-PAC 6 CLICK MOBILITY  Total Score:19       Treatment & Education:  GT. BELT AND  SOCKS DONNED PRIOR TO OOB MOBILITY.  PT GT TRAINED AND RETURNED TO . PT CONT REPORT NEED TO CLOTHES EYES. PT T/F TO CHAIR WITH CGA. PT AND  EDUCATED ON USE OF ASPEN COLLAR ADJUSTMENT AND SPINAL PRECAUTIONS NO BENDING, LIFTING AND TWISTING. PT REPORTED UNDERSTANDING . PT EDUCATED ON "CALL DON'T FALL", ENCOURAGED TO CALL FOR ASSISTANCE WITH ALL NEEDS FOR OOB MOBILITY.        Patient left up in chair with all lines intact and call button in reach.    GOALS:   Multidisciplinary Problems       Physical Therapy Goals          Problem: Physical Therapy    Goal Priority Disciplines Outcome Interventions   Physical Therapy Goal     PT, PT/OT     Description: LT24  1. PT WILL COMPLETE BED MOBILITY IND  2. PT WILL STAND T/F TO CHAIR IND FOR OOB TOLERANCE  3. PT WILL RECALL SPINAL PRECAUTIONS IND  4. PT WILL GT TRAIN X 250' WITH S TO PROGRESS GT.                        History:     Past Medical History:   Diagnosis Date    Anxiety     Hyperlipidemia     Hypertension     Sleep apnea        Past Surgical History:   Procedure Laterality Date    BUNIONECTOMY Left 2018    L great toe and side bunions    CARPAL TUNNEL RELEASE Bilateral  "    2000    CHOLECYSTECTOMY  2006    EPIDURAL STEROID INJECTION INTO CERVICAL SPINE N/A 02/21/2024    Procedure: C6/7 IL JOJO;  Surgeon: Fish Claudio MD;  Location: [facility];  Service: Pain Management;  Laterality: N/A;    EPIDURAL STEROID INJECTION INTO CERVICAL SPINE N/A 06/12/2024    Procedure: C6/7 IL JOJO;  Surgeon: Fish Claudio MD;  Location: [facility]IN [facility];  Service: Pain Management;  Laterality: N/A;    HYSTERECTOMY  1998       Time Tracking:     PT Received On: 12/03/24  PT Start Time: 0810     PT Stop Time: 0835  PT Total Time (min): 25 min     Billable Minutes: Evaluation 15 and Therapeutic Activity 10      12/03/2024

## 2024-12-03 NOTE — PROGRESS NOTES
"MEDICARE CONDITION 44     (Reference: Transmittal 200 of the Medicare Manual)     A Medicare "Inpatient Admission" may be changed to an "Outpatient" (includes  Outpatient Observation) status, if the following conditions exist:  The change in patient status from inpatient to outpatient is made prior to        discharge or release, while the patient is still a patient in the hospital.   The hospital has not submitted a claim for the inpatient admission.  A physician concurs with the Utilization Committee decision.   Physician concurrence with the Utilization Committee's decision is documented         in the patient's records.            IMPLEMENTATION OF CONDITION CODE 44     Inpatient status has been reviewed prior to discharge. Change to Outpatient Observation status, in accordance with Condition Code 44.      By entering this in the medical record, I verify that I have reviewed and concur with the findings of the Utilization Review Committee and the Utilization Review Physician, and that the identified Patient does not meet medical necessity for Inpatient status. This patient is appropriate for the downgrade in status because upon subsequent review, it was determined that the patient did not meet the medical necessity for inpatient care. Outpatient Observation is the identified level of care appropriate for the Patient, and all of the above conditions for this status change have been met.      "

## 2024-12-03 NOTE — PROGRESS NOTES
[facility] Medicine  Progress Note    Patient Name: Elina Franco  MRN: [id number]  Patient Class: OP- Outpatient Recovery   Admission Date: 12/2/2024  Length of Stay: 1 days  Attending Physician: Solis Morales MD  Primary Care Provider: Renetta Monaco MD        Subjective     Principal Problem:  Cervical stenosis      HPI:  Ms. Franco is a 64-year-old female with past medical history of hypertension, hyperlipidemia, sleep apnea, and anxiety presented for an elective ACDF with Dr. Bull on 12/02/2024.  Hospital medicine was consulted postprocedure for medical management.  Patient was seen and examined with  at bedside.  She states that her pain is fairly well-controlled and she has no trouble with swallowing the clear liquids that she was on.  Patient's voice does not appear to be hoarse and  at bedside states that she sounds normal to him.  Patient has a Hemovac in place with minimal output.  Hospital medicine will assist in managing chronic medical issues.    Overview/Hospital Course:  No notes on file    Interval History:  Follow up for cervical stenosis status post ACDF.  Patient was doing well this morning and Hemovac has been removed.  She denies any trouble with swallowing although states she has a bit of a sore throat.  Her voice does appear to be slightly more hoarse but she states that she had just woken up.  She denies any trouble with breathing.    Review of Systems  Objective:     Vital Signs (Most Recent):  Temp: 97.5 °F (36.4 °C) (12/03/24 0837)  Pulse: 71 (12/03/24 0837)  Resp: 18 (12/03/24 0901)  BP: 130/68 (12/03/24 0837)  SpO2: 96 % (12/03/24 0837) Vital Signs (24h Range):  Temp:  [97.4 °F (36.3 °C)-98.6 °F (37 °C)] 97.5 °F (36.4 °C)  Pulse:  [] 71  Resp:  [10-18] 18  SpO2:  [90 %-97 %] 96 %  BP: (126-156)/(59-87) 130/68     Weight: 71.8 kg (158 lb 4.6 oz)  Body mass index is 25.55 kg/m².    Intake/Output Summary (Last 24 hours) at 12/3/2024 1147  Last  data filed at 12/3/2024 0513  Gross per 24 hour   Intake 1256.75 ml   Output 50 ml   Net 1206.75 ml         Physical Exam  Vitals and nursing note reviewed.   Constitutional:       Appearance: Normal appearance.   HENT:      Head: Normocephalic and atraumatic.      Nose: Nose normal.      Mouth/Throat:      Mouth: Mucous membranes are moist.   Eyes:      Extraocular Movements: Extraocular movements intact.      Conjunctiva/sclera: Conjunctivae normal.   Neck:      Comments: Neck brace in place, Hemovac removed.  Bandages clean dry and intact  Cardiovascular:      Rate and Rhythm: Normal rate and regular rhythm.      Pulses: Normal pulses.      Heart sounds: Normal heart sounds.   Pulmonary:      Effort: Pulmonary effort is normal.      Breath sounds: Normal breath sounds.   Abdominal:      General: Abdomen is flat. Bowel sounds are normal.      Palpations: Abdomen is soft.   Musculoskeletal:         General: Normal range of motion.      Cervical back: Tenderness present.   Skin:     General: Skin is warm.      Capillary Refill: Capillary refill takes less than 2 seconds.   Neurological:      Mental Status: She is alert and oriented to person, place, and time. Mental status is at baseline.   Psychiatric:         Mood and Affect: Mood normal.         Behavior: Behavior normal.             Significant Labs: All pertinent labs within the past 24 hours have been reviewed.  Recent Lab Results       None            Significant Imaging:   X-Ray Cervical Spine AP And Lateral   Final Result      Postsurgical changes of the cervical spine.  No untoward finding.         Electronically signed by: Sushil Wing   Date:    12/03/2024   Time:    08:18      SURG FL Surgery Fluoro Usage   Final Result      X-Ray Cervical Spine AP And Lateral   Final Result      Fluoroscopic guidance utilized for ACDF please see procedural dictation for further details.         Electronically signed by: Sushil Wing   Date:    12/02/2024    Time:    12:12           Assessment and Plan     Cervical stenosis of spinal canal  -Dr. Morales performed an ACDF on to 12/02/2024  -pain well-controlled   -no issues with swallowing or hoarseness  -remainder of care per primary team        Primary hypertension  Patient's blood pressure range in the last 24 hours was: BP  Min: 125/72  Max: 156/70.The patient's inpatient anti-hypertensive regimen is listed below:  Current Antihypertensives  valsartan tablet 40 mg, Daily, Oral    Plan  - BP is controlled, no changes needed to their regimen  - continue home meds    Mixed hyperlipidemia  -Continue home Avapro and Crestor   -resume fish oil on discharge      Anxiety  -continue home meds        VTE Risk Mitigation (From admission, onward)           Ordered     IP VTE HIGH RISK PATIENT  Once         12/02/24 1312     Place sequential compression device  Until discontinued         12/02/24 1312                    Discharge Planning   STANLEY: 12/3/2024     Code Status: Not on file   Medical Readiness for Discharge Date: 12/3/2024  Discharge Plan A: Home Health                Please place Justification for DME        Aliya Reyes MD  Department of Hospital Medicine   [facility]

## 2024-12-03 NOTE — ASSESSMENT & PLAN NOTE
Patient's blood pressure range in the last 24 hours was: BP  Min: 125/72  Max: 156/70.The patient's inpatient anti-hypertensive regimen is listed below:  Current Antihypertensives  valsartan tablet 40 mg, Daily, Oral    Plan  - BP is controlled, no changes needed to their regimen  - continue home meds

## 2024-12-03 NOTE — PT/OT/SLP EVAL
Occupational Therapy   Evaluation    Name: Elina Franco  MRN: [id number]  Admitting Diagnosis: <principal problem not specified>  Recent Surgery: Procedure(s) (LRB):  FUSION, SPINE, CERVICAL, ANTERIOR APPROACH (N/A)  DECOMPRESSION AND FUSION, SPINE, CERVICAL, ANTERIOR APPROACH (N/A) 1 Day Post-Op    Recommendations:     Discharge Recommendations: Low Intensity Therapy  Discharge Equipment Recommendations:  walker, rolling  Barriers to discharge:  None    Assessment:     Elina Franco is a 64 y.o. female with a medical diagnosis of <principal problem not specified>.  She presents with the following performance deficits affecting function: weakness, impaired endurance, impaired sensation, impaired self care skills, impaired functional mobility, gait instability, impaired balance, decreased upper extremity function, decreased safety awareness, pain, impaired skin, orthopedic precautions.      Rehab Prognosis: Good; patient would benefit from acute skilled OT services to address these deficits and reach maximum level of function.       Plan:     Patient to be seen 2 x/week to address the above listed problems via self-care/home management, therapeutic activities, therapeutic exercises  Plan of Care Expires: 12/17/24  Plan of Care Reviewed with: patient, spouse    Subjective     Chief Complaint: NECK PAIN  Patient/Family Comments/goals: get better, improve strength and mobility    Occupational Profile:  Living Environment: lives with spouse in a 2nd floor apartment with no steps to enter. Pt has connecting garage access to home.  Previous level of function: Pt (I) with ADLs and functional mobility.   Roles and Routines: drives and works   Equipment Used at Home: none  Assistance upon Discharge: spouse    Pain/Comfort:  Pain Rating 1: 6/10  Location - Side 1: Bilateral  Location - Orientation 1: generalized  Location 1: neck  Pain Addressed 1: Reposition, Distraction  Pain Rating Post-Intervention 1: 6/10    Objective:      Communicated with: Nurse and epic chart review prior to session.  Patient found HOB elevated with peripheral IV, hemovac, cervical collar upon OT entry to room.    General Precautions: Standard, fall  Orthopedic Precautions: spinal precautions  Braces: Aspen collar  Respiratory Status: Room air    Occupational Performance:    Bed Mobility:    Patient completed Rolling/Turning to Right with stand by assistance  Patient completed Scooting/Bridging with stand by assistance  Patient completed Supine to Sit with stand by assistance    Functional Mobility/Transfers:  Patient completed Sit <> Stand Transfer with contact guard assistance  with  no assistive device   Patient completed Bed <> Chair Transfer using Stand Pivot technique with contact guard assistance with no assistive device  Functional Mobility: Patient completed x100ft functional mobility with CGA and HHA to increase dynamic standing balance and activity tolerance needed for ADL completion.   Required multiple short standing rest breaks    Activities of Daily Living:  Upper Body Dressing: maximal assistance cade gown around back and adjust cervical collar for proper fit    Cognitive/Visual Perceptual:  Cognitive/Psychosocial Skills:     -       Oriented to: Person, Place, Time, and Situation   -       Follows Commands/attention:Follows multistep  commands  -       Communication: clear/fluent  -       Memory: No Deficits noted  -       Safety awareness/insight to disability: impaired   -       Mood/Affect/Coping skills/emotional control: Cooperative and Lethargic    Physical Exam:  Sensation:    -       Impaired  Pt reports tingling in bottoms of B feet and in B shoulders  Upper Extremity Range of Motion:     -       Right Upper Extremity: WFL  -       Left Upper Extremity: WFL  Upper Extremity Strength:    -       Right Upper Extremity: 3+/5 grossly  -       Left Upper Extremity: 3+/5 grossly   Strength:    -       Right Upper Extremity: WFL  -        Left Upper Extremity: WFL    [facility] 6 Click ADL:  AMPAC Total Score: 15    Treatment & Education:  Educated pt and pt's spouse on spinal precautions and use of cervical collar, functional implications, and importance of compliance. Educated on how to adjust Aspen collar. Pt lethargic throughout, requiring verbal cueing to keep eyes open.  Patient educated on role of OT in acute setting and benefits of participation. Educated on techniques to use to increase independence and decrease fall risk with functional transfers. Educated on importance of OOB activity and calling for A to transfer back to bed and meet needs. Encouraged completion of B UE AROM therex throughout the day to tolerance to increase functional strength and activity tolerance. Educated patient on importance of increased tolerance to upright position and direct impact on CV endurance and strength. Patient encouraged to sit up in chair for a minimum of 2 consecutive hours per day. Patient stated understanding and in agreement with POC.     Patient left up in chair with all lines intact and call button in reach    GOALS:   Multidisciplinary Problems       Occupational Therapy Goals          Problem: Occupational Therapy    Goal Priority Disciplines Outcome Interventions   Occupational Therapy Goal     OT, PT/OT     Description: Goals to be met by: 12/17/24     Patient will increase functional independence with ADLs by performing:    UE Dressing with Minimal Assistance.  Grooming while standing at sink with Set-up Assistance.  Toileting from toilet with Stand-by Assistance for hygiene and clothing management.   Toilet transfer to toilet with Modified [place].  Upper extremity exercise program x15 reps per handout, with independence.                         History:     Past Medical History:   Diagnosis Date    Anxiety     Hyperlipidemia     Hypertension     Sleep apnea          Past Surgical History:   Procedure Laterality Date    BUNIONECTOMY Left  2018    L great toe and side bunions    CARPAL TUNNEL RELEASE Bilateral     2000    CHOLECYSTECTOMY  2006    EPIDURAL STEROID INJECTION INTO CERVICAL SPINE N/A 02/21/2024    Procedure: C6/7 IL JOJO;  Surgeon: Fish Claudio MD;  Location: [facility] PAIN [facility]GT;  Service: Pain Management;  Laterality: N/A;    EPIDURAL STEROID INJECTION INTO CERVICAL SPINE N/A 06/12/2024    Procedure: C6/7 IL JOJO;  Surgeon: Fish Claudio MD;  Location: [facility] PAIN [facility]GT;  Service: Pain Management;  Laterality: N/A;    HYSTERECTOMY  1998       Time Tracking:     OT Date of Treatment: 12/03/24  OT Start Time: 0805  OT Stop Time: 0830  OT Total Time (min): 25 min    Billable Minutes:Evaluation 15  Therapeutic Activity 10    12/3/2024  Lena Gruber OT

## 2024-12-03 NOTE — PLAN OF CARE
12/03/24 1136   Post-Acute Status   Post-Acute Authorization Home Health   Home Health Status Referrals Sent   Coverage medicare   Discharge Plan   Discharge Plan A Home Health     SW spoke with pt regarding hh. Pt in agreement with [facility]. SW sent hh referrals to [facility]. Pending acceptance. [facility] has accepted pt.

## 2024-12-03 NOTE — OP NOTE
Operative Note       Surgery Date: 12/2/2024     Surgeon: Solis Morales MD (Neurosurgery)    Assistant Sparkle Coburn PA-C    Implants:    PEEK Interbody Cage  Medtronic [place]ia Plate    Pre-op Diagnosis:  Cervical stenosis of spinal canal [M48.02]    Post-op Diagnosis: same    Anesthesia: GETA    Technical Procedures Used:       Cervical diskectomy and central decompression with bilateral foraminotomy C 4-5  CPT 64680-  Arthrodesis, anterior interbody, including disc space preparation, discectomy,  osteophytectomy and decompression of spinal cord and/or nerve roots; cervical  below C2     2. Cervical diskectomy and central decompression with bilateral foraminotomy C 5-6 and C 6-7   CPT 67833- x 2  Arthrodesis, anterior interbody, including disc space preparation, discectomy,  osteophytectomy and decompression of spinal cord and/or nerve roots; cervical  below C2, each additional interspace. (List separately in addition to code for primary  procedure). Code first (04191)     3. Cervical arthrodesis C 4-5-6-7 CPT 85155      4. Placement of PEEK  interbody graft C 4-5/5-6/6-7  CPT 22853 x 3     5. Placement of anterior instrumentation plate and screws C 4-7 CPT 85421     6. Use of allograft bone CPT 79696     7. Use of intraoperative neuromonitoring     Complications: No    Estimated Blood Loss: 50 mL           Specimens: none    Justification for the operation:     Elina is a 64 y.o. female who presented to the neurosurgery clinic with chronic neck pain without motor weakness of the upper extremities.  They had been treated with numerous nonoperative interventions without sustained benefit.  We discussed ACDF at length.  I discussed in detail with the patient the risks, benefits, alternatives, indications, and methods of the procedure. Some of the significant risks specific to this particular procedure are: persistent pain, cerebrospinal fluid leakage, spinal instability, paralysis, numbness, nerve or spinal cord  injury, bowel or bladder or sexual problems, chronic neck pain, need for additional surgery, lack of relief of current symptoms, onset of new symptoms, vascular  injury, stroke, recurrent laryngeal nerve injury, difficulty swallowing, fusion failure, instrumentation failure      Additional risks include: infection, bleeding, blood clots, heart attack, allergic reactions, pneumonia and other risks.  These risks can lead to death or permanent total disability.  Additional complications are also possible. Anesthesia also involves serious risks including, most importantly, a risk of reaction to medications, which can result in death or permanent total disability.      All questions were answered about the procedure, alternatives and risks. No guarantees or assurances were made about the results of the procedure.      Findings:  Disc herniation with foraminal stenosis bilaterally C4-5/5-6/6-7  Worse C5-6   Procedure in Detail:        The patient was placed supine after induction of a general anesthetic.  The neck was extended and prepped and draped in standard fashion.  Surgical approach was performed by ENT and is dictated separately.  At this point, I scrubbed in and evaluated the neck. After the initial exposure and placement of the retractors Dr Nayak exited the case and I performed the remaining portion of the procedure     Once the retractors were placed the distraction pins were placed at the C4, C5, C6 and C7 vertebral bodies.      At C4-5 diskectomy was performed with a 11. Blade.   An upgoing curette was used to undermined the PLL and the Kerrisons were used to perform the decompression out to the foramen bilaterally to remove any further progression. There was a large bilateral sided disc protrusion causing severe spinal canal stenosis at this level.  This was carefully dissected using an upgoing curette an attempt to be removed with a Kerrison.  There was no obvious drainage of CSF noted.  A blunt hook was  used to feel out into the foramen bilaterally make sure that bilaterally the foramen were free of any remaining compression.  Next the endplates were scraped clean of any remaining disc material.      Diskectomy was performed in the same fashion for levels C5-6 - and C6-7 .   At C5-6 there was a posterior osteophyte and foraminal narrowing bilaterally at this level     A trial spacer was placed to the interspace at each of these levels.    At C4-5 a standalone Medtronic peek interbody cage was placed after being filled with several CC of allograft bone.  This was secured and locked into place with 13mm screws    At C5-6 the 7 mm Medtronic peek interbody cage was placed filled with allograft bone   At C6-7 a7 mm Medtronic peek interbody cage was placed both filled with allograft bone     Medtronic Atlantis plate was placed across C5-6-7 under fluoroscopy spanning each of these segments without difficulty.    This was secured into place with 6 screws which were then locked into their final position. The wound was irrigated with bacitracin and betadine solution and hemostasis was of confirmed.  1 g of vancomycin powder was placed to the surgical cavity.  A hemovac drain was needed/placed in the surgical cavity. The platysma was then closed with 3-0 Vicryl followed by Derma bond Prineo for the skin.        Motor and SSEP readings were improved following the procedure.     Sponge, needle and instrument counts were all accounted for at the end of the case x 2. The patient tolerated the procedure well and was transferred to the recovery room in a stable condition.                 Disposition: Overnight observation.            Condition: Good    Attestation:  I performed the entire operation       Solis Morales MD  Neurosurgery     Disclaimer: This note was prepared using a voice recognition system and is likely to have sound alike errors within the text.

## 2024-12-03 NOTE — PLAN OF CARE
OT emy completed. Recommends low intensity therapy.   SBA for bed mobility. CGA for t/fs and ambulation 100ft with HHA.

## 2024-12-03 NOTE — PLAN OF CARE
Problem: Adult Inpatient Plan of Care  Goal: Optimal Comfort and Wellbeing  Outcome: Progressing     Problem: Adult Inpatient Plan of Care  Goal: Readiness for Transition of Care  Outcome: Progressing

## 2024-12-03 NOTE — SUBJECTIVE & OBJECTIVE
Interval History:  Follow up for cervical stenosis status post ACDF.  Patient was doing well this morning and Hemovac has been removed.  She denies any trouble with swallowing although states she has a bit of a sore throat.  Her voice does appear to be slightly more hoarse but she states that she had just woken up.  She denies any trouble with breathing.    Review of Systems  Objective:     Vital Signs (Most Recent):  Temp: 97.5 °F (36.4 °C) (12/03/24 0837)  Pulse: 71 (12/03/24 0837)  Resp: 18 (12/03/24 0901)  BP: 130/68 (12/03/24 0837)  SpO2: 96 % (12/03/24 0837) Vital Signs (24h Range):  Temp:  [97.4 °F (36.3 °C)-98.6 °F (37 °C)] 97.5 °F (36.4 °C)  Pulse:  [] 71  Resp:  [10-18] 18  SpO2:  [90 %-97 %] 96 %  BP: (126-156)/(59-87) 130/68     Weight: 71.8 kg (158 lb 4.6 oz)  Body mass index is 25.55 kg/m².    Intake/Output Summary (Last 24 hours) at 12/3/2024 1147  Last data filed at 12/3/2024 0513  Gross per 24 hour   Intake 1256.75 ml   Output 50 ml   Net 1206.75 ml         Physical Exam  Vitals and nursing note reviewed.   Constitutional:       Appearance: Normal appearance.   HENT:      Head: Normocephalic and atraumatic.      Nose: Nose normal.      Mouth/Throat:      Mouth: Mucous membranes are moist.   Eyes:      Extraocular Movements: Extraocular movements intact.      Conjunctiva/sclera: Conjunctivae normal.   Neck:      Comments: Neck brace in place, Hemovac removed.  Bandages clean dry and intact  Cardiovascular:      Rate and Rhythm: Normal rate and regular rhythm.      Pulses: Normal pulses.      Heart sounds: Normal heart sounds.   Pulmonary:      Effort: Pulmonary effort is normal.      Breath sounds: Normal breath sounds.   Abdominal:      General: Abdomen is flat. Bowel sounds are normal.      Palpations: Abdomen is soft.   Musculoskeletal:         General: Normal range of motion.      Cervical back: Tenderness present.   Skin:     General: Skin is warm.      Capillary Refill: Capillary refill  takes less than 2 seconds.   Neurological:      Mental Status: She is alert and oriented to person, place, and time. Mental status is at baseline.   Psychiatric:         Mood and Affect: Mood normal.         Behavior: Behavior normal.             Significant Labs: All pertinent labs within the past 24 hours have been reviewed.  Recent Lab Results       None            Significant Imaging:   X-Ray Cervical Spine AP And Lateral   Final Result      Postsurgical changes of the cervical spine.  No untoward finding.         Electronically signed by: Sushil Wing   Date:    12/03/2024   Time:    08:18      SURG FL Surgery Fluoro Usage   Final Result      X-Ray Cervical Spine AP And Lateral   Final Result      Fluoroscopic guidance utilized for ACDF please see procedural dictation for further details.         Electronically signed by: Sushil Wnig   Date:    12/02/2024   Time:    12:12

## 2024-12-04 ENCOUNTER — PATIENT MESSAGE (OUTPATIENT)
Dept: NEUROSURGERY | Facility: CLINIC | Age: 64
End: 2024-12-04
Payer: MEDICARE

## 2024-12-04 NOTE — DISCHARGE SUMMARY
Discharge Summary Neurosurgery    C4-C7ACDF      Surgeons: Dr. Morales      Hospital Course Summary:      Patient is a 64-year-old female who presented to [facility] to undergo anterior cervical discectomy and fusion with Dr. Morales. Pre operative labs and clearance was obtained prior to surgery. She was consented in preop and taken to the OR underwent general anesthesia.  Surgery was performed without complication. She was extubated immediately post operatively and tolerated the procedure well. She recovered in PACU and once stabilized transferred to med/surg inpatient floor for hemovac surgical drain management, IV abx, IV pain control, and close neuro monitoring post op. There were no issues overnight. Pain well controlled. Diet was advanced as tolerated. Pt. Worked with PT/OT. And voiding without issues, able to perform basic ADL independently. Patient was examined and neurologically stable. X rays were performed and indep reviewed. Instrumentation in good position.      I removed the hemovac drain.    Patient meets criteria below for discharge . Clear for discharge home with family.    Pain controlled with oral meds  Afebrile vss  Cervical collar in place at all times, chadd while OOB; okay to remove to shower and meals  PT/OT  X rays completed and instruction in good position.   Ambulating without assistance and performing basic ADLS  Hemovac drain removed and no longer requiring IV ABX  Voiding without issues  Neurologically stable        Constitutional/ General: Awake, alert, oriented X 3. Sitting upright in No acute distress. Well developed/well nourished    Neck: trachea midline. cervical collar in place. Incision is CDI no erythema, edema, or drainage noted;     Respiratory: No increased work of breathing on room air. Chest expansion equal and symmetric.    Neuro: AAO X3 speech is fluent and appripriate CN II-XII grossly intact throughout. EOMI. Face symmetric tongue midline. Shoulder shrug equal and intact BL.  Follows commands and moves all extremities antigravity.     Extremities:No cyanosis clubbing or edema. Ambulating without issues.           Assessment/Plan        Pt. Is POD #1 s/p C4-C7 ACDF with Dr. Salter well post operatively. I removed the hemovac drain and reinforced the surgical dressing without complication. Patient tolerated this well.  No evidence of swelling or infection. X rays indep. Reviewed and agree with impression of radiologist. Instrumentation in good position. Patients labs are WNL.         I discussPatient is neurologically stable and cleared for discharge with family. I discussed at length signs and symptoms that prompt urgent medical attention fevers, issues swallowing, drainage from incision or acute weakness. Wound care was addressed. Okay to shower only after 48 hours. No submerging the incision. Pat incision dry. No ointments or creams over the incision. Keep open to air. Cervical collar to be worn at all times while up and out of bed. Avoid NSAIDs.     Patient expressed understanding and in agreement with treatment plan.     Patient discharged with medications for pain and muscle relaxer.      Follow up In Neurosurgery clinic in 2 weeks for post operative visit.    Patient has worked well with therapy however due to deconditioning from cervical stenosis as well as postoperatively from surgery she will require PT/OT for optimal recovery.    Rolling Walker/Walker The mobility limitation cannot be sufficiently resolved by the use of a cane. Patient's functional mobility deficit can be sufficiently resolved with the use of a (Rolling Walker or Walker). Patient's mobility limitation significantly impairs their ability to participate in one of more activities of daily living. The use of a (Rolling Walker or Walker) will significantly improve the patient's ability to participate in MRADLS and the patient will use it on regular basis in the home.             Attestation:  Jadyn URBANO  GRETCHEN have obtained HPI, performed Physical Examination on the above patient, reviewed the pertinent labs, tests, imaging, other relevant data and recorded my findings in this progress note.           This note was produced using dictation software of voice recognition technology, and some typographical errors may be present.

## 2024-12-05 ENCOUNTER — PATIENT MESSAGE (OUTPATIENT)
Dept: NEUROSURGERY | Facility: CLINIC | Age: 64
End: 2024-12-05
Payer: MEDICARE

## 2024-12-06 ENCOUNTER — OFFICE VISIT (OUTPATIENT)
Dept: NEUROSURGERY | Facility: CLINIC | Age: 64
End: 2024-12-06
Payer: MEDICARE

## 2024-12-06 VITALS
BODY MASS INDEX: 25.44 KG/M2 | HEART RATE: 82 BPM | WEIGHT: 158.31 LBS | HEIGHT: 66 IN | SYSTOLIC BLOOD PRESSURE: 152 MMHG | DIASTOLIC BLOOD PRESSURE: 92 MMHG

## 2024-12-06 DIAGNOSIS — Z98.1 S/P CERVICAL SPINAL FUSION: Primary | ICD-10-CM

## 2024-12-06 PROCEDURE — 99999 PR PBB SHADOW E&M-EST. PATIENT-LVL III: CPT | Mod: PBBFAC,,, | Performed by: NEUROLOGICAL SURGERY

## 2024-12-06 PROCEDURE — 99213 OFFICE O/P EST LOW 20 MIN: CPT | Mod: PBBFAC | Performed by: NEUROLOGICAL SURGERY

## 2024-12-06 RX ORDER — METHYLPREDNISOLONE 4 MG/1
TABLET ORAL
Qty: 21 EACH | Refills: 0 | Status: SHIPPED | OUTPATIENT
Start: 2024-12-06 | End: 2024-12-27

## 2024-12-06 RX ORDER — OXYCODONE AND ACETAMINOPHEN 10; 325 MG/1; MG/1
1 TABLET ORAL EVERY 8 HOURS PRN
Qty: 30 TABLET | Refills: 0 | Status: SHIPPED | OUTPATIENT
Start: 2024-12-06

## 2024-12-06 RX ORDER — DIAZEPAM 5 MG/1
5 TABLET ORAL EVERY 6 HOURS PRN
Qty: 10 TABLET | Refills: 0 | Status: SHIPPED | OUTPATIENT
Start: 2024-12-06 | End: 2025-01-05

## 2024-12-06 NOTE — PROGRESS NOTES
Subjective:      Patient ID: Elina Franco is a 64 y.o. female.    Chief Complaint: Post-op Evaluation (Pt reports for f/u after ACDF 12/02/24;    Patient here today for follow-up recently had cervical ACDF C4-7\   Postoperatively she message the office with questions about surgical procedure with ongoing pain  We scheduled her in early follow-up appointment to evaluate and make sure  she was not having any complications   In room today she reports neck pain as  6/10   She notes ongoing pain into the shoulders symptoms were worse yesterday when she sent the message but seems to be getting better today    She has evidence of bruising where the neuro monitoring leads were placed as well as bruising underneath her clavicle where the brace was meeting the skin   She has been compliant with her brace   She has been tolerating a diet and has not had any difficulty with liquids however she does note that she tried eating chicken and felt as though it took longer for the food to get down/sticking   tolerating a p.o. diet\   No wheezing hoarseness or shortness of breath or any difficulty breathing   Denies any weakness or numbness and tingling   No fever/ drainage from incision      Review of Systems   Constitutional:  Negative for activity change, appetite change and chills.   HENT:  Negative for congestion and ear pain.    Eyes:  Negative for photophobia, redness and visual disturbance.   Respiratory:  Negative for apnea and chest tightness.    Cardiovascular:  Negative for chest pain.   Gastrointestinal:  Negative for abdominal distention and abdominal pain.   Endocrine: Negative for cold intolerance.   Genitourinary:  Negative for difficulty urinating.   Musculoskeletal:  Positive for myalgias, neck pain and neck stiffness. Negative for arthralgias.   Skin:  Negative for color change.   Allergic/Immunologic: Negative for environmental allergies.   Neurological:  Negative for dizziness, weakness and numbness.    Hematological:  Negative for adenopathy. Does not bruise/bleed easily.   Psychiatric/Behavioral:  Negative for agitation, behavioral problems and confusion.          Objective:       Neurosurgery Physical Exam  Ortho/SPM Exam  Ortho Exam     Her incision  had mild erythema   There was bruising her shoulders where the needles were placed for neuro monitoring   Her incision was intact and the surgical glue was in place   Without any drainage, mild bruising around incision site  5/5 throughout all extremities             I  reviewed all pertinent imaging regarding this case.  Assessment:     1. S/P cervical spinal fusion      Plan:     S/P cervical spinal fusion        Reassured the patient she is not having any concerning symptoms currently   I will place her on a Medrol Dosepak as well as change some of her medications to see if this will offer her better pain relief   We will follow her clinically.   Please call the office/have neurosurgery con call page/ or go to the ER if develop any difficulty breathing swelling change in symptoms or any new neurologic symptoms  Will follow up in next week to make sure she continues to improve     Solis Morales MD  Neurosurgery     Disclaimer: This note was prepared using a voice recognition system and is likely to have sound alike errors within the text.

## 2024-12-08 NOTE — ANESTHESIA POSTPROCEDURE EVALUATION
Anesthesia Post Evaluation    Patient: Elina Franco    Procedure(s) Performed: Procedure(s) (LRB):  FUSION, SPINE, CERVICAL, ANTERIOR APPROACH (N/A)  DECOMPRESSION AND FUSION, SPINE, CERVICAL, ANTERIOR APPROACH (N/A)  FUSION, SPINE, ANTERIOR USING INTERBODY FUSION SYSTEM (N/A)  FUSION, SPINE, WITH INSTRUMENTATION (N/A)  SURGICAL PROCUREMENT,BONE GRAFT,SPINE (N/A)    Final Anesthesia Type: general      Patient location during evaluation: PACU  Patient participation: Yes- Able to Participate  Level of consciousness: awake and alert, oriented and awake  Post-procedure vital signs: reviewed and stable  Pain management: adequate  Airway patency: patent    PONV status at discharge: No PONV  Anesthetic complications: no      Cardiovascular status: blood pressure returned to baseline  Respiratory status: unassisted  Hydration status: euvolemic  Follow-up not needed.              Vitals Value Taken Time   /92 12/06/24 1156   Temp 36.9 °C (98.5 °F) 12/03/24 1625   Pulse 82 12/06/24 1156   Resp 16 12/03/24 1625   SpO2 97 % 12/03/24 1625         Event Time   Out of Recovery 13:05:00         Pain/Trinity Score: No data recorded

## 2024-12-11 ENCOUNTER — PATIENT MESSAGE (OUTPATIENT)
Dept: NEUROSURGERY | Facility: CLINIC | Age: 64
End: 2024-12-11
Payer: MEDICARE

## 2024-12-20 ENCOUNTER — OFFICE VISIT (OUTPATIENT)
Dept: NEUROSURGERY | Facility: CLINIC | Age: 64
End: 2024-12-20
Payer: MEDICARE

## 2024-12-20 VITALS
WEIGHT: 155.19 LBS | HEART RATE: 79 BPM | DIASTOLIC BLOOD PRESSURE: 77 MMHG | BODY MASS INDEX: 25.05 KG/M2 | SYSTOLIC BLOOD PRESSURE: 132 MMHG

## 2024-12-20 DIAGNOSIS — Z98.1 S/P CERVICAL SPINAL FUSION: Primary | ICD-10-CM

## 2024-12-20 PROCEDURE — 99213 OFFICE O/P EST LOW 20 MIN: CPT | Mod: PBBFAC | Performed by: PHYSICIAN ASSISTANT

## 2024-12-20 PROCEDURE — 99999 PR PBB SHADOW E&M-EST. PATIENT-LVL III: CPT | Mod: PBBFAC,,, | Performed by: PHYSICIAN ASSISTANT

## 2024-12-20 RX ORDER — OXYCODONE AND ACETAMINOPHEN 10; 325 MG/1; MG/1
1 TABLET ORAL EVERY 4 HOURS PRN
Qty: 42 TABLET | Refills: 0 | Status: SHIPPED | OUTPATIENT
Start: 2024-12-20 | End: 2024-12-27

## 2024-12-20 RX ORDER — METHYLPREDNISOLONE 4 MG/1
TABLET ORAL
Qty: 21 EACH | Refills: 0 | Status: SHIPPED | OUTPATIENT
Start: 2024-12-20 | End: 2025-01-10

## 2024-12-20 RX ORDER — TIZANIDINE 4 MG/1
4 TABLET ORAL EVERY 6 HOURS PRN
Qty: 30 TABLET | Refills: 1 | Status: SHIPPED | OUTPATIENT
Start: 2024-12-20 | End: 2025-01-04

## 2024-12-20 NOTE — PROGRESS NOTES
Subjective:      Patient ID: Elina Franco is a 64 y.o. female.    Chief Complaint: Post-op Evaluation (Pt reports for f/u after ACDF 12/02/24;    Patient here today for follow-up recently had cervical ACDF C4-7\  We scheduled her in early follow-up appointment to evaluate and make sure  she was not having any complications   In room today she reports neck pain as  6/10   She notes ongoing pain into the shoulders symptoms were worse yesterday when she sent the message but seems to be getting better today    She reports improvement in her symptoms   She has been compliant with her brace   She has been tolerating a diet and has not had any difficulty with liquids however she does note that she tried eating chicken and felt as though it took longer for the food to get down/sticking   tolerating a p.o. diet\   No wheezing hoarseness or shortness of breath or any difficulty breathing   Denies any weakness or numbness and tingling   No fever/ drainage from incision    Review of Systems   Constitutional:  Negative for activity change, appetite change and chills.   HENT:  Negative for congestion and ear pain.    Eyes:  Negative for photophobia, redness and visual disturbance.   Respiratory:  Negative for apnea and chest tightness.    Cardiovascular:  Negative for chest pain.   Gastrointestinal:  Negative for abdominal distention and abdominal pain.   Endocrine: Negative for cold intolerance.   Genitourinary:  Negative for difficulty urinating.   Musculoskeletal:  Positive for myalgias, neck pain and neck stiffness. Negative for arthralgias.   Skin:  Negative for color change.   Allergic/Immunologic: Negative for environmental allergies.   Neurological:  Negative for dizziness, weakness and numbness.   Hematological:  Negative for adenopathy. Does not bruise/bleed easily.   Psychiatric/Behavioral:  Negative for agitation, behavioral problems and confusion.          Objective:       Neurosurgery Physical Exam  Ortho/SPM  Exam  Ortho Exam     Her incision  had mild erythema   There was bruising her shoulders where the needles were placed for neuro monitoring   Her incision was intact and the surgical glue was in place   Without any drainage, mild bruising around incision site  5/5 throughout all extremities             I  reviewed all pertinent imaging regarding this case.  Assessment:     1. S/P cervical spinal fusion      Plan:     S/P cervical spinal fusion  -     X-Ray Cervical Spine AP And Lateral; Future; Expected date: 12/20/2024  -     oxyCODONE-acetaminophen (PERCOCET)  mg per tablet; Take 1 tablet by mouth every 4 (four) hours as needed for Pain.  Dispense: 42 tablet; Refill: 0  -     Ambulatory Referral/Consult to Physical Therapy; Future; Expected date: 12/27/2024    Other orders  -     tiZANidine (ZANAFLEX) 4 MG tablet; Take 1 tablet (4 mg total) by mouth every 6 (six) hours as needed (spasming).  Dispense: 30 tablet; Refill: 1  -     methylPREDNISolone (MEDROL DOSEPACK) 4 mg tablet; use as directed  Dispense: 21 each; Refill: 0        Reassured the patient she is not having any concerning symptoms currently   I will place her on a Medrol Dosepak as well as change some of her medications to see if this will offer her better pain relief ; refill pain medication muscle relaxer physical therapy referral    We will obtain x-rays prior to next visit   We will follow her clinically.   Please call the office/have neurosurgery con call page/ or go to the ER if develop any difficulty breathing swelling change in symptoms or any new neurologic symptoms  Will follow up in next week to make sure she continues to improve       Sparkle Coburn PA-C      Neurosurgery           Disclaimer: This note was prepared using a voice recognition system and is likely to have sound alike errors within the text.

## 2025-01-02 ENCOUNTER — CLINICAL SUPPORT (OUTPATIENT)
Dept: REHABILITATION | Facility: HOSPITAL | Age: 65
End: 2025-01-02
Payer: MEDICARE

## 2025-01-02 DIAGNOSIS — R52 PAIN: Primary | ICD-10-CM

## 2025-01-02 DIAGNOSIS — R53.1 WEAKNESS: ICD-10-CM

## 2025-01-02 DIAGNOSIS — Z98.1 S/P CERVICAL SPINAL FUSION: ICD-10-CM

## 2025-01-02 PROCEDURE — 97161 PT EVAL LOW COMPLEX 20 MIN: CPT

## 2025-01-02 PROCEDURE — 97112 NEUROMUSCULAR REEDUCATION: CPT

## 2025-01-02 PROCEDURE — 97530 THERAPEUTIC ACTIVITIES: CPT

## 2025-01-02 NOTE — PROGRESS NOTES
[facility]   Physical Therapy Treatment Note      Name: Elina Franco  Clinic Number: [id number]    Therapy Diagnosis:   Encounter Diagnoses   Name Primary?    Pain Yes    Weakness      Physician: Jadyn Coburn PA-C    Visit Date: 1/6/2025    Physician Orders: PT Eval and Treat   Medical Diagnosis from Referral: S/P cervical spinal fusion  Evaluation Date: 1/2/2025  Authorization Period Expiration: 12/20/2025  Plan of Care Expiration: 3/14/2025  Progress Note Due: 30 days  Visit # / Visits authorized: 1/ 10 + eval  FOTO: 1/3  Precautions: Standard, ACDF C 4-7/ DOS 12/2/24/ soft collar at home and hard collar when she is out of the home, bone stimulator for about 2 weeks    PTA Visit #: 0/5     Time In: 2:00  Time Out: 2:54  Total Billable Time: 54 minutes    Subjective     Pt reports: she is tired because she did not sleep well.  She was somewhat compliant with home exercise program.  Response to previous treatment: good  Functional change: n/a at this time    Pain: 2/10  Location: bilateral neck and B shoulders    Objective      Objective Measures updated at progress report unless specified.     Treatment     Elina received the following treatments:    Elina received the following manual therapy techniques applied for (8) minutes, including:  Scar massage     Elina received therapeutic exercises to develop strength, endurance, ROM, flexibility, posture, and core stabilization for (12)  minutes including:  Nustep for ROM, strength, and endurance x 6 minutes  Corner wall stretch 3 x 30 secs  Supine shoulder flexion 2 x 10 reps with 5 sec hold using a cane    Elina participated in neuromuscular re-education activities to improve: Balance, Coordination, Kinesthetic, Sense, Proprioception, and Posture for (24)  minutes., including:  Chin tucks 3 x 10 reps  Supine nods up and down 3 x 10 reps  Supine W slides 3 x 10 reps  Scapula retraction with Y TB 3 x 10 reps in sitting with brace  donned  Shoulder rolls 2 x 10 reps standing with brace donned          Elina participated in dynamic functional therapeutic activities to improve functional performance for (10)  minutes, including:  Supine chest pulls with yellow TB 2 x 10 reps  Supine diagonals with yellow TB 2 x 10 reps    Patient Education and Home Exercises       Education provided:   - HEP reviewed    Written Home Exercises Provided: Patient instructed to cont prior HEP. Exercises were reviewed and Elina was able to demonstrate them prior to the end of the session.  Elina demonstrated good  understanding of the education provided. See EMR under Patient Instructions for exercises provided during therapy sessions    Assessment     First visit after initial evaluation. Patient is 5 weeks post op. She tolerated new exercises with minimal difficulty. Patient was tired after the session. Scar massage performed due to keloid. Scar a little red. PT will monitor. PT will progress patient respecting healing process.     Elina Is progressing well towards her goals.   Pt prognosis is Good.     Pt will continue to benefit from skilled outpatient physical therapy to address the deficits listed in the problem list box on initial evaluation, provide pt/family education and to maximize pt's level of independence in the home and community environment.     Pt's spiritual, cultural and educational needs considered and pt agreeable to plan of care and goals.     Anticipated barriers to physical therapy: none    Goals: STG's 2 weeks  Patient will be independent with 50% of HEP Progressing     LTG's 10 weeks  1.Patient will improve FOTO functional measure score  to 52 % in order to improve overall QOL & return to PLOF   2. Patient will report an overall decrease in pain with ADL's and functional mobility  3.Patient will increase strength by at least 1/2 muscle grade in affected musculature to improve functional mobility  4. Patient will improve cervical ROM to  improve functional mobility and ADL's  5.Patient will be more aware of posture throughout the day to reduce stress  and maintain optimal alignment of the spine  6. Patient will be independent with HEP and pain    Plan     Plan of care Certification: 1/2/2025 to 3/14/2025.     Outpatient Physical Therapy 2 times weekly for 10 weeks to include the following interventions: Manual Therapy, Moist Heat/ Ice, Neuromuscular Re-ed, Patient Education, Self Care, Therapeutic Activities, Therapeutic Exercise, and Ultrasound, ASTYM, Kinesiotaping PRN, Functional Dry Needling      Lidia Alvarado, PT

## 2025-01-02 NOTE — PLAN OF CARE
[facility]   Physical Therapy Initial Evaluation      Name: Elina Franco  Clinic Number: [id number]    Therapy Diagnosis:   Encounter Diagnoses   Name Primary?    S/P cervical spinal fusion     Pain Yes    Weakness      Physician: Jadyn Coburn PA-C    Physician Orders: PT Eval and Treat   Medical Diagnosis from Referral: S/P cervical spinal fusion  Evaluation Date: 1/2/2025  Authorization Period Expiration: 12/20/2025  Plan of Care Expiration: 3/14/2025  Progress Note Due: 30 days  Visit # / Visits authorized: 1/ 1  FOTO: 1/3  Precautions: Standard, ACDF C 4-7/ DOS 12/2/24/ soft collar at home and hard collar when she is out of the home, bone stimulator for about 2 weeks    Time In: 9:00  Time Out: 9:40  Total Billable Time: 40 minutes (low Complexity Evaluation, Therapeutic Exercise - 0, Manual Therapy - 0, Therapeutic activities- 12, Neuro muscular re education 12 )        Subjective   Date of onset: 12/2/24  History of current condition - Elina reports: her neck has been aching and pain prior to surgery has resolved. Patient reports fatigue with watching TV. She notices  her neck is a little more achy today due to change in cold weather. F/u with Dr. Morales 1/17 2025. Has not taken pain medicine for about 2 weeks.      Medical History:   Past Medical History:   Diagnosis Date    Anxiety     Hyperlipidemia     Hypertension     Sleep apnea        Surgical History:   Elina Franco  has a past surgical history that includes Hysterectomy (1998); Epidural steroid injection into cervical spine (N/A, 02/21/2024); Epidural steroid injection into cervical spine (N/A, 06/12/2024); Carpal tunnel release (Bilateral); Cholecystectomy (2006); Bunionectomy (Left, 2018); fusion, spine, cervical, anterior approach (N/A, 12/2/2024); Decompression of cervical spine by anterior approach with fusion (N/A, 12/2/2024); fusion, spine, anterior using interbody fusion system (N/A, 12/2/2024); Fusion of  spine with instrumentation (N/A, 12/2/2024); and surgical procurement,bone graft,spine (N/A, 12/2/2024).    Medications:   Elina has a current medication list which includes the following prescription(s): diazepam, gabapentin, irbesartan, methylprednisolone, oxycodone-acetaminophen, pantoprazole, rosuvastatin, sertraline, mounjaro, tizanidine, and zolpidem.    Allergies:   Review of patient's allergies indicates:   Allergen Reactions    Statins-hmg-coa reductase inhibitors Swelling and Other (See Comments)     Lower extremity cramp with pravastatin in 2016.    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 and plans to return by 1/20/2025  Prior Level of Function: independent  Current Level of Function: no heavy lifting, achy in neck and shoulders, not currently driving    Pain:   Current 4/10, worst 4/10, best 0/10   Location: bilateral neck and shoulders   Description: Aching  Aggravating Factors: increase activity  Easing Factors: rest    Pts goals: increase strength in neck and return to PRIOR LEVEL OF FUNCTION     Objective     Posture: R hand dominant, forward head, rounding shoulders, scar keloid  Palpation: B UT's muscle tension  Sensation: intact to light touch B UE's  Range of Motion/Strength:   CERVICAL AROM Pain/Dysfunction with Movement   Flexion 10    Extension 15        Shoulder Right Left Pain/Dysfunction with Movement   AROM/PROM      flexion  WNL's  WNL's    abduction  WNL's/  WNL's    Internal rotation  T10  T10    ER at 90° abd  Hands behind the head  Hands behind the head        U/E MMT Right Left Pain/Dysfunction with Movement   Shoulder Flexion 5/5 5/5    Shoulder Abduction 5/5 5/5    Shoulder IR 4/5 4/5    Shoulder ER  @ 0* Abduction 3+/5 4/5          Intake Outcome Measure for FOTO cervical Survey    Therapist reviewed FOTO scores for Elina Franco  on 2025.   FOTO documents entered into [employer] - see Media section.    Intake Score: 44%       TREATMENT   Treatment Time In: 9:00  Treatment Time Out: 9:40  Total Treatment time separate from Evaluation: 24 minutes    Elina received therapeutic exercises to develop strength, endurance, ROM, flexibility, posture, and core stabilization for 0 minutes includin    Elina received the following manual therapy techniques:  were applied to the: 0 for 0 minutes, includin    neuromuscular re-education activities to improve: Coordination, Kinesthetic, Sense, Proprioception, and Posture for 12 minutes. The following activities were included:  Chin tucks   Shoulder rolls  Scapula retraction  - Posture education in sitting using a pillow to facilitate upright posture    therapeutic activities to improve functional performance for 12  minutes, including:  -HEP issued and reviewed with patient, form and technique reviewed, benefits and frequency discussed  -Surgical precautions reviewed  -scar massage reviewed with patient  - FOTO completed        Home Exercises Provided and Patient Education Provided       Education/Self-Care provided:   Patient educated on the impairments noted above and the effects of physical therapy intervention to improve overall condition and QOL.   Patient was educated on all the above exercise prior/during/after for proper posture, positioning, and execution for safe performance with home exercise program.   Patient educated on postural awareness and the use of a lumbar roll when in a seated position to reduce stress and maintain optimal alignment of the spine.     Written Home Exercises Provided: yes.  Exercises were reviewed and Elina was able to demonstrate them prior to the end of the session.  Elina demonstrated good  understanding of the education provided.     See EMR under Patient Instructions for exercises provided 2025.      Assessment   Elina is a 64 y.o. female referred to  outpatient Physical Therapy with a medical diagnosis of S/P cervical spinal fusion. Patient is S/P ACDF C4-7. Patient is a little over 4 weeks post op. Pt presents with mild complaints of pain, posture deficits, and weakness. Patient would benefit from skilled PT intervention for post op rehab to return to PRIOR LEVEL OF FUNCTION.    Pt prognosis is Good.   Pt will benefit from skilled outpatient Physical Therapy to address the deficits stated above and in the chart below, provide pt/family education, and to maximize pt's level of independence.     Plan of care discussed with patient: Yes  Pt's spiritual, cultural and educational needs considered and patient is agreeable to the plan of care and goals as stated below:     Anticipated Barriers for therapy: none       Medical Necessity is demonstrated by the following  History  Co-morbidities and personal factors that may impact the plan of care [x] LOW: no personal factors / co-morbidities  [] MODERATE: 1-2 personal factors / co-morbidities  [] HIGH: 3+ personal factors / co-morbidities     Moderate / High Support Documentation:   Co-morbidities affecting plan of care:      Personal Factors:   no deficits      Examination  Body Structures and Functions, activity limitations and participation restrictions that may impact the plan of care [x] LOW: addressing 1-2 elements  [] MODERATE: 3+ elements  [] HIGH: 4+ elements (please support below)     Moderate / High Support Documentation:       Clinical Presentation [x] LOW: stable  [] MODERATE: Evolving  [] HIGH: Unstable      Decision Making/ Complexity Score: low         Goals:  STG's 2 weeks  Patient will be independent with 50% of HEP    LTG's 10 weeks  1.Patient will improve FOTO functional measure score  to 52 % in order to improve overall QOL & return to PLOF   2. Patient will report an overall decrease in pain with ADL's and functional mobility  3.Patient will increase strength by at least 1/2 muscle grade in  affected musculature to   improve functional mobility  4. Patient will improve cervical ROM to improve functional mobility and ADL's  5.Patient will be more aware of posture throughout the day to reduce stress  and maintain optimal alignment of the spine  6. Patient will be independent with HEP and pain management  Plan   Plan of care Certification: 1/2/2025 to 3/14/2025.    Outpatient Physical Therapy 2 times weekly for 10 weeks to include the following interventions: Manual Therapy, Moist Heat/ Ice, Neuromuscular Re-ed, Patient Education, Self Care, Therapeutic Activities, Therapeutic Exercise, and Ultrasound, ASTYM, Kinesiotaping PRN, Functional Dry Needling    Lidia Alvarado, PT      I CERTIFY THE NEED FOR THESE SERVICES FURNISHED UNDER THIS PLAN OF TREATMENT AND WHILE UNDER MY CARE   Physician's comments:     Physician's Signature: ___________________________________________________

## 2025-01-06 ENCOUNTER — CLINICAL SUPPORT (OUTPATIENT)
Dept: REHABILITATION | Facility: HOSPITAL | Age: 65
End: 2025-01-06
Payer: MEDICARE

## 2025-01-06 DIAGNOSIS — R52 PAIN: Primary | ICD-10-CM

## 2025-01-06 DIAGNOSIS — R53.1 WEAKNESS: ICD-10-CM

## 2025-01-06 PROCEDURE — 97110 THERAPEUTIC EXERCISES: CPT

## 2025-01-06 PROCEDURE — 97530 THERAPEUTIC ACTIVITIES: CPT

## 2025-01-06 PROCEDURE — 97112 NEUROMUSCULAR REEDUCATION: CPT

## 2025-01-08 ENCOUNTER — PATIENT MESSAGE (OUTPATIENT)
Dept: NEUROSURGERY | Facility: CLINIC | Age: 65
End: 2025-01-08
Payer: MEDICARE

## 2025-01-08 NOTE — TELEPHONE ENCOUNTER
I spoke with patient, who has been informed that she should not being driving while wearing the hard collar. I have advised that she continues wearing the soft collar until given further instructions by the provider.     Pt has been informed that the provider is in surgery today but I will discuss this with her tomorrow while in clinic. She verbalized understanding.

## 2025-01-09 ENCOUNTER — PATIENT MESSAGE (OUTPATIENT)
Dept: NEUROSURGERY | Facility: CLINIC | Age: 65
End: 2025-01-09
Payer: MEDICARE

## 2025-01-13 ENCOUNTER — CLINICAL SUPPORT (OUTPATIENT)
Dept: REHABILITATION | Facility: HOSPITAL | Age: 65
End: 2025-01-13
Payer: MEDICARE

## 2025-01-13 DIAGNOSIS — R53.1 WEAKNESS: ICD-10-CM

## 2025-01-13 DIAGNOSIS — R52 PAIN: Primary | ICD-10-CM

## 2025-01-13 PROCEDURE — 97140 MANUAL THERAPY 1/> REGIONS: CPT

## 2025-01-13 PROCEDURE — 97112 NEUROMUSCULAR REEDUCATION: CPT

## 2025-01-13 NOTE — PROGRESS NOTES
[facility]   Physical Therapy Treatment Note      Name: Elina Franco  Clinic Number: [id number]    Therapy Diagnosis:   Encounter Diagnoses   Name Primary?    Pain Yes    Weakness      Physician: Jadyn Coburn PA-C    Visit Date: 1/13/2025    Physician Orders: PT Eval and Treat   Medical Diagnosis from Referral: S/P cervical spinal fusion  Evaluation Date: 1/2/2025  Authorization Period Expiration: 12/20/2025  Plan of Care Expiration: 3/14/2025  Progress Note Due: 30 days  Visit # / Visits authorized: 2/ 10 + eval  FOTO: 1/3  Precautions: Standard, ACDF C 4-7/ DOS 12/2/24/ soft collar at home and hard collar when she is out of the home, bone stimulator for about 2 weeks    PTA Visit #: 0/5     Time In: 2:00  Time Out: 2:56  Total Billable Time: 28 minutes    Subjective     Pt reports: she reports she had stomach problems and missed her last appointment. She reports she has a f/u with neuro 1/17/25. Hard collar d/c and patient stopped wearing on 1/7/25. She still wears to drive.   She was somewhat compliant with home exercise program.  Response to previous treatment: good  Functional change: n/a at this time    Pain: 2-3/10  Location: bilateral neck and mostly L UT    Objective      Objective Measures updated at progress report unless specified.     Treatment     Elina received the following treatments:    Elina received the following manual therapy techniques applied for (8) minutes, including:  STM B UT's     Elina received therapeutic exercises to develop strength, endurance, ROM, flexibility, posture, and core stabilization for NC minutes including:  UBE for upright posture,  ROM, strength, and endurance x 3' F/ 3' B  Supine shoulder flexion 2 x 10 reps with 5 sec hold using a cane  Corner wall stretch 3 x 30 secs      Elina participated in neuromuscular re-education activities to improve: Balance, Coordination, Kinesthetic, Sense, Proprioception, and Posture for 20 minutes.,  including:  Chin tucks 3 x 10 reps  Supine nods up and down 3 x 10 reps  Supine W slides 3 x 10 reps  Supine DNF 1 x 10 reps  Scapula retraction with Y TB 3 x 10 reps in standing  Shoulder extension with Y TB 2 x 10 reps to open posture in standing  Wall push ups with a plus  Shoulder rolls 3  x 10 reps standing   Supine serratus anterior        Elina participated in dynamic functional therapeutic activities to improve functional performance for NC  minutes, including:  Supine chest pulls with yellow TB 3 x 10 reps  Supine diagonals with yellow TB 2 x 10 reps    Patient Education and Home Exercises       Education provided:   - HEP reviewed    Written Home Exercises Provided: Patient instructed to cont prior HEP. Exercises were reviewed and Elina was able to demonstrate them prior to the end of the session.  Elina demonstrated good  understanding of the education provided. See EMR under Patient Instructions for exercises provided during therapy sessions    Assessment   Patient is 6 weeks post op. Patient presents to clinic with no neck brace. Neck brace d/c. Patient still wearing when driving. Patient tolerated treatment with less difficulty. PT will progress patient as tolerated.       First visit after initial evaluation. Patient is 5 weeks post op. She tolerated new exercises with minimal difficulty. Patient was tired after the session. Scar massage performed due to keloid. Scar a little red. PT will monitor. PT will progress patient respecting healing process.     Elina Is progressing well towards her goals.   Pt prognosis is Good.     Pt will continue to benefit from skilled outpatient physical therapy to address the deficits listed in the problem list box on initial evaluation, provide pt/family education and to maximize pt's level of independence in the home and community environment.     Pt's spiritual, cultural and educational needs considered and pt agreeable to plan of care and goals.     Anticipated  barriers to physical therapy: none    Goals: STG's 2 weeks  Patient will be independent with 50% of HEP Progressing     LTG's 10 weeks  1.Patient will improve FOTO functional measure score  to 52 % in order to improve overall QOL & return to PLOF   2. Patient will report an overall decrease in pain with ADL's and functional mobility  3.Patient will increase strength by at least 1/2 muscle grade in affected musculature to improve functional mobility  4. Patient will improve cervical ROM to improve functional mobility and ADL's  5.Patient will be more aware of posture throughout the day to reduce stress  and maintain optimal alignment of the spine  6. Patient will be independent with HEP and pain    Plan     Plan of care Certification: 1/2/2025 to 3/14/2025.     Outpatient Physical Therapy 2 times weekly for 10 weeks to include the following interventions: Manual Therapy, Moist Heat/ Ice, Neuromuscular Re-ed, Patient Education, Self Care, Therapeutic Activities, Therapeutic Exercise, and Ultrasound, ASTYM, Kinesiotaping PRN, Functional Dry Needling      Lidia Alvarado, PT

## 2025-01-16 ENCOUNTER — CLINICAL SUPPORT (OUTPATIENT)
Dept: REHABILITATION | Facility: HOSPITAL | Age: 65
End: 2025-01-16
Payer: MEDICARE

## 2025-01-16 DIAGNOSIS — R52 PAIN: Primary | ICD-10-CM

## 2025-01-16 DIAGNOSIS — R53.1 WEAKNESS: ICD-10-CM

## 2025-01-16 PROCEDURE — 97112 NEUROMUSCULAR REEDUCATION: CPT

## 2025-01-16 PROCEDURE — 97140 MANUAL THERAPY 1/> REGIONS: CPT

## 2025-01-16 PROCEDURE — 97110 THERAPEUTIC EXERCISES: CPT

## 2025-01-16 NOTE — PROGRESS NOTES
[facility]   Physical Therapy Treatment Note      Name: Elina Franco  Clinic Number: [id number]    Therapy Diagnosis:   Encounter Diagnoses   Name Primary?    Pain Yes    Weakness      Physician: Jadyn Coburn PA-C    Visit Date: 1/16/2025    Physician Orders: PT Eval and Treat   Medical Diagnosis from Referral: S/P cervical spinal fusion  Evaluation Date: 1/2/2025  Authorization Period Expiration: 12/20/2025  Plan of Care Expiration: 3/14/2025  Progress Note Due: 30 days  Visit # / Visits authorized: 3/ 10 + eval  FOTO: 1/3  Precautions: Standard, ACDF C 4-7/ DOS 12/2/24/ soft collar at home and hard collar when she is out of the home, bone stimulator for about 2 weeks    PTA Visit #: 0/5     Time In: 12:46  Time Out: 1:42  Total Billable Time: 45 minutes    Subjective     Pt reports: she feels tight and sore and indicates L UT. . She reports she has a f/u with neuro 1/17/25. Hard collar d/c and patient stopped wearing on 1/7/25. She still wears to drive.   She was somewhat compliant with home exercise program.  Response to previous treatment: good  Functional change: n/a at this time    Pain: 2/10  Location: bilateral neck and mostly L UT    Objective      Objective Measures updated at progress report unless specified.     Treatment     Elina received the following treatments:    Elina received the following manual therapy techniques applied for (8) minutes, including:  STM B UT's     Elina received therapeutic exercises to develop strength, endurance, ROM, flexibility, posture, and core stabilization for 10  minutes including:  UBE for upright posture,  ROM, strength, and endurance x 3' F/ 3' B  Supine shoulder flexion 2 x 10 reps with 5 sec hold using a cane  Corner wall stretch 3 x 30 secs      Elina participated in neuromuscular re-education activities to improve: Balance, Coordination, Kinesthetic, Sense, Proprioception, and Posture for 19  minutes., including:  Chin tucks 3 x  10 reps  Supine nods up and down 3 x 10 reps  Supine W slides 3 x 10 reps  Supine DNF 2 x 10 reps  Supine serratus anterior punches 2 x 10 reps  Shoulder rolls 3  x 10 reps standing   Wall push ups with a plus 3 x 10 reps  Cervical isometrics 1 x 10 reps each direction  Scapula retraction with Y TB 3 x 10 reps in standing  Shoulder extension with Y TB 2 x 10 reps to open posture in standing      Elina participated in dynamic functional therapeutic activities to improve functional performance for 8 minutes, including:  Sitting with upright posture chest pulls with yellow TB 3 x 10 reps  Sitting with upright posture diagonals with yellow TB 1 x 10 reps    Patient Education and Home Exercises       Education provided:   - HEP reviewed    Written Home Exercises Provided: Patient instructed to cont prior HEP. Exercises were reviewed and Elina was able to demonstrate them prior to the end of the session.  Elina demonstrated good  understanding of the education provided. See EMR under Patient Instructions for exercises provided during therapy sessions    Assessment   Patient is 6 weeks post op. Patient is responding well to treatment and progressing with functional mobility. PT will continue to progress patient respecting the healing process. Patient was tired after the session.       Elina Is progressing well towards her goals.   Pt prognosis is Good.     Pt will continue to benefit from skilled outpatient physical therapy to address the deficits listed in the problem list box on initial evaluation, provide pt/family education and to maximize pt's level of independence in the home and community environment.     Pt's spiritual, cultural and educational needs considered and pt agreeable to plan of care and goals.     Anticipated barriers to physical therapy: none    Goals: STG's 2 weeks  Patient will be independent with 50% of HEP Progressing     LTG's 10 weeks  1.Patient will improve FOTO functional measure score  to 52 %  in order to improve overall QOL & return to PLOF   2. Patient will report an overall decrease in pain with ADL's and functional mobility  3.Patient will increase strength by at least 1/2 muscle grade in affected musculature to improve functional mobility  4. Patient will improve cervical ROM to improve functional mobility and ADL's  5.Patient will be more aware of posture throughout the day to reduce stress  and maintain optimal alignment of the spine  6. Patient will be independent with HEP and pain    Plan     Plan of care Certification: 1/2/2025 to 3/14/2025.     Outpatient Physical Therapy 2 times weekly for 10 weeks to include the following interventions: Manual Therapy, Moist Heat/ Ice, Neuromuscular Re-ed, Patient Education, Self Care, Therapeutic Activities, Therapeutic Exercise, and Ultrasound, ASTYM, Kinesiotaping PRN, Functional Dry Needling      Lidia Alvarado, PT

## 2025-01-17 ENCOUNTER — OFFICE VISIT (OUTPATIENT)
Dept: NEUROSURGERY | Facility: CLINIC | Age: 65
End: 2025-01-17
Payer: MEDICARE

## 2025-01-17 ENCOUNTER — HOSPITAL ENCOUNTER (OUTPATIENT)
Dept: RADIOLOGY | Facility: HOSPITAL | Age: 65
Discharge: HOME | End: 2025-01-17
Attending: PHYSICIAN ASSISTANT
Payer: MEDICARE

## 2025-01-17 VITALS
HEART RATE: 74 BPM | DIASTOLIC BLOOD PRESSURE: 85 MMHG | WEIGHT: 154.13 LBS | SYSTOLIC BLOOD PRESSURE: 139 MMHG | BODY MASS INDEX: 24.87 KG/M2

## 2025-01-17 DIAGNOSIS — Z98.1 S/P CERVICAL SPINAL FUSION: ICD-10-CM

## 2025-01-17 DIAGNOSIS — Z98.1 S/P CERVICAL SPINAL FUSION: Primary | ICD-10-CM

## 2025-01-17 PROCEDURE — 99999 PR PBB SHADOW E&M-EST. PATIENT-LVL III: CPT | Mod: PBBFAC,,, | Performed by: PHYSICIAN ASSISTANT

## 2025-01-17 PROCEDURE — 72040 X-RAY EXAM NECK SPINE 2-3 VW: CPT | Mod: 26,,, | Performed by: RADIOLOGY

## 2025-01-17 PROCEDURE — 72040 X-RAY EXAM NECK SPINE 2-3 VW: CPT | Mod: TC

## 2025-01-17 PROCEDURE — 99213 OFFICE O/P EST LOW 20 MIN: CPT | Mod: PBBFAC,25 | Performed by: PHYSICIAN ASSISTANT

## 2025-01-17 PROCEDURE — 99024 POSTOP FOLLOW-UP VISIT: CPT | Mod: POP,,, | Performed by: PHYSICIAN ASSISTANT

## 2025-01-17 RX ORDER — TIZANIDINE 4 MG/1
4 TABLET ORAL EVERY 6 HOURS PRN
Qty: 30 TABLET | Refills: 1 | Status: SHIPPED | OUTPATIENT
Start: 2025-01-17 | End: 2025-02-01

## 2025-01-17 NOTE — PROGRESS NOTES
Subjective:      Patient ID: Elina Franco is a 64 y.o. female.    Chief Complaint: Post-op Evaluation (Pt reports for f/u after ACDF 12/02/24;    Patient here today for follow-up recently had cervical ACDF C4-7  Patient is 6 weeks postop she is doing well she has been working with physical therapy and notes improvement of range of motion she continues to have left-sided tightness in spasming along her neck posteriorly and into her left shoulder and along trapezius musculature she is no longer wearing collar.  She is ambulatory without assistive device she reports incision better healing does feel as though sometimes has something in her throat but overall swallowing well and no issues with liquids or solids.   She presents today with x-rays of her cervical spine which we reviewed together the x-rays show instrumentation in appropriate position no shift and no acute abnormalities appreciated   Denies any weakness or numbness and tingling   No fever/ drainage from incision  Patient has no bowel bladder dysfunction  She does report difficulty sleeping  She has not been taking her muscle relaxers          Review of Systems   Constitutional:  Negative for activity change, appetite change and chills.   HENT:  Negative for congestion and ear pain.    Eyes:  Negative for photophobia, redness and visual disturbance.   Respiratory:  Negative for apnea and chest tightness.    Cardiovascular:  Negative for chest pain.   Gastrointestinal:  Negative for abdominal distention and abdominal pain.   Endocrine: Negative for cold intolerance.   Genitourinary:  Negative for difficulty urinating.   Musculoskeletal:  Positive for myalgias, neck pain and neck stiffness. Negative for arthralgias.   Skin:  Negative for color change.   Allergic/Immunologic: Negative for environmental allergies.   Neurological:  Negative for dizziness, weakness and numbness.   Hematological:  Negative for adenopathy. Does not bruise/bleed easily.    Psychiatric/Behavioral:  Negative for agitation, behavioral problems and confusion.          Objective:       Neurosurgery Physical Exam  Ortho/SPM Exam  Ortho Exam      Constitutional/ General: Awake, alert, oriented X 3. Sitting upright in No acute distress. Well developed/well nourished    Neck: trachea midline. Incision is CDI well healing    Respiratory: No increased work of breathing on room air. Chest expansion equal and symmetric.    Neuro: AAO X3 speech is fluent and appripriate CN II-XII grossly intact throughout. EOMI. Face symmetric tongue midline. Shoulder shrug equal and intact BL. Follows commands and moves all extremities antigravity with good strength throughout    Extremities:No cyanosis clubbing or edema. Ambulating without issues.              I  reviewed all pertinent imaging regarding this case.  Assessment:     1. S/P cervical spinal fusion      Plan:     S/P cervical spinal fusion  -     X-Ray Cervical Spine AP And Lateral; Future; Expected date: 01/17/2025    Other orders  -     tiZANidine (ZANAFLEX) 4 MG tablet; Take 1 tablet (4 mg total) by mouth every 6 (six) hours as needed (spasming).  Dispense: 30 tablet; Refill: 1      6 weeks post op acdf    Patient remains neurologically stable we reviewed her x-rays from today which show instrumentation in appropriate position      Reassured the patient she is not having any concerning symptoms currently         We will obtain x-rays prior to next visit in 6 weeks   We will follow her clinically.         Please call the office/have neurosurgery con call page/ or go to the ER if develop aany new neurologic symptoms      Sparkle Coubrn PA-C      Neurosurgery     Attestation:  IJadyn PA-C have obtained HPI, performed Physical Examination on the above patient, reviewed the pertinent labs, tests, imaging, other relevant data and recorded my findings in this clinic note.    This note was produced using dictation software of voice recognition  technology, and some typographical errors may be present.         Disclaimer: This note was prepared using a voice recognition system and is likely to have sound alike errors within the text.

## 2025-01-21 ENCOUNTER — DOCUMENTATION ONLY (OUTPATIENT)
Dept: REHABILITATION | Facility: HOSPITAL | Age: 65
End: 2025-01-21
Payer: MEDICARE

## 2025-01-24 ENCOUNTER — CLINICAL SUPPORT (OUTPATIENT)
Dept: REHABILITATION | Facility: HOSPITAL | Age: 65
End: 2025-01-24
Payer: MEDICARE

## 2025-01-24 DIAGNOSIS — R52 PAIN: Primary | ICD-10-CM

## 2025-01-24 DIAGNOSIS — R53.1 WEAKNESS: ICD-10-CM

## 2025-01-24 PROCEDURE — 97112 NEUROMUSCULAR REEDUCATION: CPT

## 2025-01-24 PROCEDURE — 97140 MANUAL THERAPY 1/> REGIONS: CPT

## 2025-01-24 NOTE — PROGRESS NOTES
[facility]   Physical Therapy Treatment Note      Name: Elina Franco  Clinic Number: [id number]    Therapy Diagnosis:   Encounter Diagnoses   Name Primary?    Pain Yes    Weakness        Physician: Jadyn Coburn PA-C    Visit Date: 1/24/2025    Physician Orders: PT Eval and Treat   Medical Diagnosis from Referral: S/P cervical spinal fusion  Evaluation Date: 1/2/2025  Authorization Period Expiration: 12/20/2025  Plan of Care Expiration: 3/14/2025  Progress Note Due: 30 days  Visit # / Visits authorized: 4/ 10 + eval  FOTO: 1/3  Precautions: Standard, ACDF C 4-7/ DOS 12/2/24/ soft collar at home and hard collar when she is out of the home, bone stimulator for about 2 weeks    PTA Visit #: 0/5     Time In: 10:00  Time Out: 10:53  Total Billable Time: 25 minutes    Subjective     Pt reports:  she is still having more pain on L side. Patient reports she is going back to work on Monday  She was somewhat compliant with home exercise program.  Response to previous treatment: good  Functional change: n/a at this time    Pain: 2/10  Location: bilateral neck and mostly L UT    Objective      Objective Measures updated at progress report unless specified.     Treatment     Elina received the following treatments:    Elina received the following manual therapy techniques applied for (8) minutes, including:  STM B UT's   Gentle joint mobs to L shoulder  Scapula mobs    Elina received therapeutic exercises to develop strength, endurance, ROM, flexibility, posture, and core stabilization for NC minutes including:  UBE for upright posture,  ROM, strength, and endurance x 3' F/ 3' B  Supine shoulder flexion 2 x 10 reps with 5 sec hold using a cane  Corner wall stretch 3 x 30 secs      Elina participated in neuromuscular re-education activities to improve: Balance, Coordination, Kinesthetic, Sense, Proprioception, and Posture for 17  minutes., including:  Chin tucks 3 x 10 reps  Supine nods up and down  3 x 10 reps  Cervical rotation 2 x 10 reps each way  Open books with 10 sec hold 1 x 10 reps each way with cervical rotation      Supine W slides 3 x 10 reps  Supine DNF 2 x 10 reps  Supine serratus anterior punches 2 x 10 reps  Shoulder rolls 3  x 10 reps standing   Wall push ups with a plus 3 x 10 reps  Cervical isometrics 1 x 10 reps each direction  Scapula retraction with Y TB 3 x 10 reps in standing  Shoulder extension with Y TB 2 x 10 reps to open posture in standing      Elina participated in dynamic functional therapeutic activities to improve functional performance for NC minutes, including:  Sitting with upright posture chest pulls with yellow TB 3 x 10 reps  Sitting with upright posture diagonals with yellow TB 1 x 10 reps    Patient Education and Home Exercises       Education provided:   - HEP reviewed    Written Home Exercises Provided: Patient instructed to cont prior HEP. Exercises were reviewed and Elina was able to demonstrate them prior to the end of the session.  Elina demonstrated good  understanding of the education provided. See EMR under Patient Instructions for exercises provided during therapy sessions    Assessment   Patient is 7 weeks post op. She is still having mild pain especially on L side. She tolerated new exercises with mild difficulty. She fatigues quickly and requires constant cuing for form and technique. PT will continue to progress patient respecting the healing process. Patient was tired after the session.       Elina Is progressing well towards her goals.   Pt prognosis is Good.     Pt will continue to benefit from skilled outpatient physical therapy to address the deficits listed in the problem list box on initial evaluation, provide pt/family education and to maximize pt's level of independence in the home and community environment.     Pt's spiritual, cultural and educational needs considered and pt agreeable to plan of care and goals.     Anticipated barriers to physical  therapy: none    Goals: STG's 2 weeks  Patient will be independent with 50% of HEP Progressing     LTG's 10 weeks  1.Patient will improve FOTO functional measure score  to 52 % in order to improve overall QOL & return to PLOF   2. Patient will report an overall decrease in pain with ADL's and functional mobility  3.Patient will increase strength by at least 1/2 muscle grade in affected musculature to improve functional mobility  4. Patient will improve cervical ROM to improve functional mobility and ADL's  5.Patient will be more aware of posture throughout the day to reduce stress  and maintain optimal alignment of the spine  6. Patient will be independent with HEP and pain    Plan     Plan of care Certification: 1/2/2025 to 3/14/2025.     Outpatient Physical Therapy 2 times weekly for 10 weeks to include the following interventions: Manual Therapy, Moist Heat/ Ice, Neuromuscular Re-ed, Patient Education, Self Care, Therapeutic Activities, Therapeutic Exercise, and Ultrasound, ASTYM, Kinesiotaping PRN, Functional Dry Needling      Lidia Alvarado, PT

## 2025-01-27 ENCOUNTER — CLINICAL SUPPORT (OUTPATIENT)
Dept: REHABILITATION | Facility: HOSPITAL | Age: 65
End: 2025-01-27
Payer: MEDICARE

## 2025-01-27 DIAGNOSIS — R53.1 WEAKNESS: ICD-10-CM

## 2025-01-27 DIAGNOSIS — R52 PAIN: Primary | ICD-10-CM

## 2025-01-27 PROCEDURE — 97140 MANUAL THERAPY 1/> REGIONS: CPT

## 2025-01-27 PROCEDURE — 97112 NEUROMUSCULAR REEDUCATION: CPT

## 2025-01-27 NOTE — PROGRESS NOTES
[facility]   Physical Therapy Treatment Note      Name: Elina Franco  Clinic Number: [id number]    Therapy Diagnosis:   Encounter Diagnoses   Name Primary?    Pain Yes    Weakness        Physician: Jadyn Coburn PA-C    Visit Date: 1/27/2025    Physician Orders: PT Eval and Treat   Medical Diagnosis from Referral: S/P cervical spinal fusion  Evaluation Date: 1/2/2025  Authorization Period Expiration: 12/20/2025  Plan of Care Expiration: 3/14/2025  Progress Note Due: 30 days  Visit # / Visits authorized: 5/ 10 + eval  FOTO: 2/3  Precautions: Standard, ACDF C 4-7/ DOS 12/2/24/ soft collar at home and hard collar when she is out of the home, bone stimulator for about 2 weeks    PTA Visit #: 0/5     Time In: 10:01  Time Out: 10:55  Total Billable Time:30  minutes    Subjective     Pt reports:  she mopped yesterday with no pain and reports she woke up with increase pain.   She was somewhat compliant with home exercise program.  Response to previous treatment: good  Functional change: n/a at this time    Pain: 2-3/ 10  Location: bilateral neck and mostly L UT    Objective    1/27/25 Cervical AROM flexion 30, extension 40, cervical rotation 55 B, with mild discomfort  1/27/25 FOTO completed    Treatment     Elina received the following treatments:    Elina received the following manual therapy techniques applied for (8) minutes, including:  STM B UT's   Gentle joint mobs to L shoulder  Scapula mobs    Elina received therapeutic exercises to develop strength, endurance, ROM, flexibility, posture, and core stabilization for NC minutes including:  UBE for upright posture,  ROM, strength, and endurance x 3' F/ 3' B  Supine shoulder flexion 2 x 10 reps with 5 sec hold using a cane  Cervical AROM    Corner wall stretch 3 x 30 secs      Elina participated in neuromuscular re-education activities to improve: Balance, Coordination, Kinesthetic, Sense, Proprioception, and Posture for 22  minutes.,  including:  Chin tucks 3 x 10 reps  Prone neck retraction 2 x 10 reps  Supine nods up and down 3 x 10 reps  Shoulder rolls 2 x 10 reps  Shoulder shrugs 2# 3 x 10 reps  Wall push ups with a plus 3 x 10 reps  Cervical isometrics 1 x 10 reps each direction    Cervical rotation 2 x 10 reps each way  Open books with 10 sec hold 1 x 10 reps each way with cervical rotation  Supine W slides 3 x 10 reps  Supine DNF 2 x 10 reps  Supine serratus anterior punches 2 x 10 reps    Scapula retraction with Y TB 3 x 10 reps in standing  Shoulder extension with Y TB 2 x 10 reps to open posture in standing      Elina participated in dynamic functional therapeutic activities to improve functional performance for NC minutes, including:  Sitting with upright posture chest pulls with yellow TB 3 x 10 reps  Sitting with upright posture diagonals with yellow TB 1 x 10 reps    Patient Education and Home Exercises       Education provided:   - HEP reviewed    Written Home Exercises Provided: Patient instructed to cont prior HEP. Exercises were reviewed and Elina was able to demonstrate them prior to the end of the session.  Elina demonstrated good  understanding of the education provided. See EMR under Patient Instructions for exercises provided during therapy sessions    Assessment   Patient is 8 weeks post op. She is still having mild pain especially on L side. She is having increase pain and fatigue in the neck with increase functional activities like mopping. She is demonstrating improved cervical AROM since initial evaluation. Cervical AROM flexion 30, extension 40, cervical rotation 55 B, with mild discomfort. She had difficulty with prone neck retraction and recruitment of musculature. FOTO score is improving.  PT will continue to progress patient respecting the healing process. Patient was tired after the session.       Elina Is progressing well towards her goals.   Pt prognosis is Good.     Pt will continue to benefit from skilled  outpatient physical therapy to address the deficits listed in the problem list box on initial evaluation, provide pt/family education and to maximize pt's level of independence in the home and community environment.     Pt's spiritual, cultural and educational needs considered and pt agreeable to plan of care and goals.     Anticipated barriers to physical therapy: none    Goals: STG's 2 weeks  Patient will be independent with 50% of HEP MET 1/27/25     LTG's 10 weeks  1.Patient will improve FOTO functional measure score  to 52 % in order to improve overall QOL & return to PLOF 1/27/25 46 progressing  2. Patient will report an overall decrease in pain with ADL's and functional mobility  3.Patient will increase strength by at least 1/2 muscle grade in affected musculature to improve functional mobility  4. Patient will improve cervical ROM to improve functional mobility and ACTIVITIES OF DAILY LIVING's Progressing  5.Patient will be more aware of posture throughout the day to reduce stress  and maintain optimal alignment of the spine Progressing  6. Patient will be independent with HEP and pain    Plan     Plan of care Certification: 1/2/2025 to 3/14/2025.     Outpatient Physical Therapy 2 times weekly for 10 weeks to include the following interventions: Manual Therapy, Moist Heat/ Ice, Neuromuscular Re-ed, Patient Education, Self Care, Therapeutic Activities, Therapeutic Exercise, and Ultrasound, ASTYM, Kinesiotaping PRN, Functional Dry Needling      Lidia Alvarado, PT

## 2025-01-29 ENCOUNTER — CLINICAL SUPPORT (OUTPATIENT)
Dept: REHABILITATION | Facility: HOSPITAL | Age: 65
End: 2025-01-29
Payer: MEDICARE

## 2025-01-29 DIAGNOSIS — R52 PAIN: Primary | ICD-10-CM

## 2025-01-29 DIAGNOSIS — R53.1 WEAKNESS: ICD-10-CM

## 2025-01-29 PROCEDURE — 97530 THERAPEUTIC ACTIVITIES: CPT

## 2025-01-29 PROCEDURE — 97112 NEUROMUSCULAR REEDUCATION: CPT

## 2025-01-29 NOTE — PROGRESS NOTES
[facility]   Physical Therapy Treatment Note      Name: Elina Franco  Clinic Number: [id number]    Therapy Diagnosis:   Encounter Diagnoses   Name Primary?    Pain Yes    Weakness        Physician: Jadyn Coburn PA-C    Visit Date: 1/29/2025    Physician Orders: PT Eval and Treat   Medical Diagnosis from Referral: S/P cervical spinal fusion  Evaluation Date: 1/2/2025  Authorization Period Expiration: 12/20/2025  Plan of Care Expiration: 3/14/2025  Progress Note Due: 30 days  Visit # / Visits authorized: 6/ 10 + eval  FOTO: 2/3  Precautions: Standard, ACDF C 4-7/ DOS 12/2/24/ soft collar at home and hard collar when she is out of the home, bone stimulator for about 2 weeks    PTA Visit #: 0/5     Time In: 8:00  Time Out: 8:42  Total Billable Time: 23  minutes    Subjective     Pt reports:  she does not feel well and her sinuses are bothering her. She reports her neck feels tight today.  She was somewhat compliant with home exercise program.  Response to previous treatment: good  Functional change: n/a at this time    Pain: 2-3/ 10  Location: bilateral neck and mostly L UT    Objective    1/27/25 Cervical AROM flexion 30, extension 40, cervical rotation 55 B, with mild discomfort  1/27/25 FOTO completed    Treatment     Elina received the following treatments:    Elina received the following manual therapy techniques applied for 0 minutes, including:  STM B UT's   Gentle joint mobs to L shoulder  Scapula mobs    Elina received therapeutic exercises to develop strength, endurance, ROM, flexibility, posture, and core stabilization for NC minutes including:  UBE for upright posture,  ROM, strength, and endurance x 3' F/ 3' B    deferred  Supine shoulder flexion 2 x 10 reps with 5 sec hold using a cane  Cervical AROM    Corner wall stretch 3 x 30 secs      Elina participated in neuromuscular re-education activities to improve: Balance, Coordination, Kinesthetic, Sense, Proprioception, and  Posture for 13 minutes., including:  Chin tucks 3 x 10 reps  Prone neck retraction 2 x 10 reps  Supine nods up and down 3 x 10 reps  Shoulder rolls 2 x 10 reps  Shoulder shrugs 3# 3 x 10 reps  Cervical rotation 1 x 10 reps each way  Open books with 10 sec hold 1 x 10 reps each way with cervical rotation  Supine serratus anterior punches 3 x 10 reps/   Sitting thoracic extension with ball with chin tucks     deferred  Wall push ups with a plus 3 x 10 reps  Cervical isometrics 1 x 10 reps each direction        Supine W slides 3 x 10 reps  Supine DNF 2 x 10 reps  Scapula retraction with Y TB 3 x 10 reps in standing  Shoulder extension with Y TB 2 x 10 reps to open posture in standing      Elian participated in dynamic functional therapeutic activities to improve functional performance for 10 minutes, including:  Sitting with upright posture chest pulls with yellow TB 2 x 10 reps  Sitting with upright posture diagonals with yellow TB 2 x 10 reps    Patient Education and Home Exercises       Education provided:   - HEP reviewed    Written Home Exercises Provided: Patient instructed to cont prior HEP. Exercises were reviewed and Elina was able to demonstrate them prior to the end of the session.  Elina demonstrated good  understanding of the education provided. See EMR under Patient Instructions for exercises provided during therapy sessions    Assessment   Patient is 8 weeks post op. She is still having mild pain and tightness  especially on L side. She is still very weak in neck and scapula musculature. She tolerated treatment well and reported she felt better after the session.   PT will continue to progress patient respecting the healing process.       Elina Is progressing well towards her goals.   Pt prognosis is Good.     Pt will continue to benefit from skilled outpatient physical therapy to address the deficits listed in the problem list box on initial evaluation, provide pt/family education and to maximize pt's  level of independence in the home and community environment.     Pt's spiritual, cultural and educational needs considered and pt agreeable to plan of care and goals.     Anticipated barriers to physical therapy: none    Goals: STG's 2 weeks  Patient will be independent with 50% of HEP MET 1/27/25     LTG's 10 weeks  1.Patient will improve FOTO functional measure score  to 52 % in order to improve overall QOL & return to PLOF 1/27/25 46 progressing  2. Patient will report an overall decrease in pain with ADL's and functional mobility  3.Patient will increase strength by at least 1/2 muscle grade in affected musculature to improve functional mobility  4. Patient will improve cervical ROM to improve functional mobility and ACTIVITIES OF DAILY LIVING's Progressing  5.Patient will be more aware of posture throughout the day to reduce stress  and maintain optimal alignment of the spine Progressing  6. Patient will be independent with HEP and pain    Plan     Plan of care Certification: 1/2/2025 to 3/14/2025.     Outpatient Physical Therapy 2 times weekly for 10 weeks to include the following interventions: Manual Therapy, Moist Heat/ Ice, Neuromuscular Re-ed, Patient Education, Self Care, Therapeutic Activities, Therapeutic Exercise, and Ultrasound, ASTYM, Kinesiotaping PRN, Functional Dry Needling      Lidia Alvarado, PT

## 2025-02-05 ENCOUNTER — DOCUMENTATION ONLY (OUTPATIENT)
Dept: REHABILITATION | Facility: HOSPITAL | Age: 65
End: 2025-02-05
Payer: MEDICARE

## 2025-02-05 NOTE — PROGRESS NOTES
PT called patient regarding cancelled appointments this week. PT LVM offering to RS appointments. Patient also notified she had no other PT appointments scheduled at this time.

## 2025-02-10 ENCOUNTER — CLINICAL SUPPORT (OUTPATIENT)
Dept: REHABILITATION | Facility: HOSPITAL | Age: 65
End: 2025-02-10
Payer: MEDICARE

## 2025-02-10 DIAGNOSIS — R53.1 WEAKNESS: ICD-10-CM

## 2025-02-10 DIAGNOSIS — R52 PAIN: Primary | ICD-10-CM

## 2025-02-10 PROCEDURE — 97140 MANUAL THERAPY 1/> REGIONS: CPT

## 2025-02-10 PROCEDURE — 97112 NEUROMUSCULAR REEDUCATION: CPT

## 2025-02-10 PROCEDURE — 97530 THERAPEUTIC ACTIVITIES: CPT

## 2025-02-10 NOTE — PROGRESS NOTES
[facility]   Physical Therapy Treatment Note      Name: Elina Franco  Clinic Number: [id number]    Therapy Diagnosis:   Encounter Diagnoses   Name Primary?    Pain Yes    Weakness        Physician: Jadyn Coburn PA-C    Visit Date: 2/10/2025    Physician Orders: PT Eval and Treat   Medical Diagnosis from Referral: S/P cervical spinal fusion  Evaluation Date: 1/2/2025  Authorization Period Expiration: 12/20/2025  Plan of Care Expiration: 3/14/2025  Progress Note Due: 30 days  Visit # / Visits authorized: 7/ 10 + eval  FOTO: 2/3  Precautions: Standard, ACDF C 4-7/ DOS 12/2/24/ soft collar at home and hard collar when she is out of the home, bone stimulator for about 2 weeks    PTA Visit #: 0/5     Time In: 3:01  Time Out: 3:57  Total Billable Time: 56  minutes    Subjective     Pt reports:  she was out last week due to illness. She reports tightness and feeling achy especially on L side.  She was somewhat compliant with home exercise program.  Response to previous treatment: good  Functional change: n/a at this time    Pain: 2/ 10  Location: bilateral neck and mostly L UT    Objective    1/27/25 Cervical AROM flexion 30, extension 40, cervical rotation 55 B, with mild discomfort  1/27/25 FOTO completed    Treatment     Elina received the following treatments:    Elina received the following manual therapy techniques applied for 10 minutes, including:  STM B UT's / TP  Stretching to pectoralis musculature    deferred  Gentle joint mobs to L shoulder  Scapula mobs    Elina received therapeutic exercises to develop strength, endurance, ROM, flexibility, posture, and core stabilization for 8 minutes including:  UBE for upright posture,  ROM, strength, and endurance x 3' F/ 3' B  1/2 foam stretch thoracic/ stretch 2 minutes    deferred  Supine shoulder flexion 2 x 10 reps with 5 sec hold using a cane  Cervical AROM    Corner wall stretch 3 x 30 secs      Elina participated in neuromuscular  re-education activities to improve: Balance, Coordination, Kinesthetic, Sense, Proprioception, and Posture for 16 minutes., including:  Prone neck retraction 3 x 10 reps  Prone W's 3 x 10 reps  Supine serratus anterior punches 3 x 10 reps 2#  Shoulder shrugs 3# 3 x 10 reps  Shoulder rolls 3 x 10 reps 3#      deferred  Chin tucks 3 x 10 reps  Prone neck retraction 3 x 10 reps  Supine nods up and down 3 x 10 reps  Cervical rotation 1 x 10 reps each way  Open books with 10 sec hold 1 x 10 reps each way with cervical rotation  Sitting thoracic extension with ball with chin tucks     Wall push ups with a plus 3 x 10 reps  Cervical isometrics 1 x 10 reps each direction        Supine W slides 3 x 10 reps  Supine DNF 2 x 10 reps  Scapula retraction with Y TB 3 x 10 reps in standing  Shoulder extension with Y TB 2 x 10 reps to open posture in standing      Elina participated in dynamic functional therapeutic activities to improve functional performance for 23 minutes, including:  Sitting with upright posture chest pulls with red  TB 2 x 10 reps  Sitting with upright posture diagonals with red TB 2 x 10 reps  Shoulder press 2# 2 x 10 reps  TRX low rows 2 x 10 reps  Rebounder with weighted red ball overhead 15 throws    Patient Education and Home Exercises       Education provided:   - HEP reviewed    Written Home Exercises Provided: Patient instructed to cont prior HEP. Exercises were reviewed and Elina was able to demonstrate them prior to the end of the session.  Elina demonstrated good  understanding of the education provided. See EMR under Patient Instructions for exercises provided during therapy sessions    Assessment   Patient is 10 weeks post op. Patient was out last week due to illness. She is having mild pain in neck especially L side. 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 She tolerated treatment well and  progressed with exercises and resistance today with no complaints of increase pain.   PT  will continue to progress patient respecting the healing process.       Elina Is progressing well towards her goals.   Pt prognosis is Good.     Pt will continue to benefit from skilled outpatient physical therapy to address the deficits listed in the problem list box on initial evaluation, provide pt/family education and to maximize pt's level of independence in the home and community environment.     Pt's spiritual, cultural and educational needs considered and pt agreeable to plan of care and goals.     Anticipated barriers to physical therapy: none    Goals: STG's 2 weeks  Patient will be independent with 50% of HEP MET 1/27/25     LTG's 10 weeks  1.Patient will improve FOTO functional measure score  to 52 % in order to improve overall QOL & return to PLOF 1/27/25 46 progressing  2. Patient will report an overall decrease in pain with ADL's and functional mobility Progressing  3.Patient will increase strength by at least 1/2 muscle grade in affected musculature to improve functional mobility  4. Patient will improve cervical ROM to improve functional mobility and ACTIVITIES OF DAILY LIVING's Progressing  5.Patient will be more aware of posture throughout the day to reduce stress  and maintain optimal alignment of the spine Progressing  6. Patient will be independent with HEP and pain Progressing    Plan     Plan of care Certification: 1/2/2025 to 3/14/2025.     Outpatient Physical Therapy 2 times weekly for 10 weeks to include the following interventions: Manual Therapy, Moist Heat/ Ice, Neuromuscular Re-ed, Patient Education, Self Care, Therapeutic Activities, Therapeutic Exercise, and Ultrasound, ASTYM, Kinesiotaping PRN, Functional Dry Needling      Lidia Alvarado, PT

## 2025-02-12 ENCOUNTER — CLINICAL SUPPORT (OUTPATIENT)
Dept: REHABILITATION | Facility: HOSPITAL | Age: 65
End: 2025-02-12
Payer: MEDICARE

## 2025-02-12 DIAGNOSIS — R53.1 WEAKNESS: ICD-10-CM

## 2025-02-12 DIAGNOSIS — R52 PAIN: Primary | ICD-10-CM

## 2025-02-12 PROCEDURE — 97110 THERAPEUTIC EXERCISES: CPT

## 2025-02-12 PROCEDURE — 97112 NEUROMUSCULAR REEDUCATION: CPT

## 2025-02-12 PROCEDURE — 97530 THERAPEUTIC ACTIVITIES: CPT

## 2025-02-12 NOTE — PROGRESS NOTES
[facility]   Physical Therapy Treatment Note      Name: Elina Franco  Clinic Number: [id number]    Therapy Diagnosis:   Encounter Diagnoses   Name Primary?    Pain Yes    Weakness        Physician: Jadyn Coburn PA-C    Visit Date: 2/12/2025    Physician Orders: PT Eval and Treat   Medical Diagnosis from Referral: S/P cervical spinal fusion  Evaluation Date: 1/2/2025  Authorization Period Expiration: 12/20/2025  Plan of Care Expiration: 3/14/2025  Progress Note Due: 30 days  Visit # / Visits authorized: 8/ 10 + eval  FOTO: 2/3  Precautions: Standard, ACDF C 4-7/ DOS 12/2/24/ soft collar at home and hard collar when she is out of the home, bone stimulator for about 2 weeks    PTA Visit #: 0/5     Time In: 8:02  Time Out: 8:56  Total Billable Time: 54  minutes    Subjective     Pt reports:  soreness all over and stiffness in neck especially L side.  She was somewhat compliant with home exercise program.  Response to previous treatment: good  Functional change: n/a at this time    Pain: 2-3/ 10  Location: bilateral neck and mostly L UT    Objective    1/27/25 Cervical AROM flexion 30, extension 40, cervical rotation 55 B, with mild discomfort  1/27/25 FOTO completed    Treatment     Elina received the following treatments:    Elina received the following manual therapy techniques applied for 8 minutes, including:  STM B UT's / TP  Stretching to pectoralis musculature    deferred  Gentle joint mobs to L shoulder  Scapula mobs    Elina received therapeutic exercises to develop strength, endurance, ROM, flexibility, posture, and core stabilization for 8 minutes including:  UBE for upright posture,  ROM, strength, and endurance x 3' F/ 3' B R:3  Corner wall stretch 3 x 30 secs    deferred  1/2 foam stretch thoracic/ stretch 2 minutes  Supine shoulder flexion 2 x 10 reps with 5 sec hold using a cane  Cervical AROM          Elina participated in neuromuscular re-education activities to improve:  Balance, Coordination, Kinesthetic, Sense, Proprioception, and Posture for 15 minutes., including:  Prone neck retraction 3 x 10 reps  Prone W's 3 x 10 reps  Supine serratus anterior punches 3 x 10 reps 2#  Shoulder shrugs 2# 3 x 10 reps  Shoulder rolls 2 x 10 reps 2#      deferred  Chin tucks 3 x 10 reps  Prone neck retraction 3 x 10 reps  Supine nods up and down 3 x 10 reps  Cervical rotation 1 x 10 reps each way  Open books with 10 sec hold 1 x 10 reps each way with cervical rotation  Sitting thoracic extension with ball with chin tucks     Wall push ups with a plus 3 x 10 reps  Cervical isometrics 1 x 10 reps each direction        Supine W slides 3 x 10 reps  Supine DNF 2 x 10 reps  Scapula retraction with Y TB 3 x 10 reps in standing  Shoulder extension with Y TB 2 x 10 reps to open posture in standing      Elina participated in dynamic functional therapeutic activities to improve functional performance for 23 minutes, including:  Sitting with upright posture chest pulls with red  TB 2 x 10 reps  Sitting with upright posture diagonals with red TB 2 x 10 reps  Shoulder press 2# 2 x 10 reps  TRX low rows 2 x 10 reps  Rebounder with weighted red ball overhead 15 throws    Patient Education and Home Exercises       Education provided:   - HEP reviewed    Written Home Exercises Provided: Patient instructed to cont prior HEP. Exercises were reviewed and Elina was able to demonstrate them prior to the end of the session.  Elina demonstrated good  understanding of the education provided. See EMR under Patient Instructions for exercises provided during therapy sessions    Assessment   Patient is 10 weeks post op. She presents to clinic with mild pain, stiffness, and soreness all over. Patient fatigues quickly with posture exercises and required cuing for form throughout the session.   PT will continue to progress patient respecting the healing process.       Elina Is progressing well towards her goals.   Pt prognosis is  Good.     Pt will continue to benefit from skilled outpatient physical therapy to address the deficits listed in the problem list box on initial evaluation, provide pt/family education and to maximize pt's level of independence in the home and community environment.     Pt's spiritual, cultural and educational needs considered and pt agreeable to plan of care and goals.     Anticipated barriers to physical therapy: none    Goals: STG's 2 weeks  Patient will be independent with 50% of HEP MET 1/27/25     LTG's 10 weeks  1.Patient will improve FOTO functional measure score  to 52 % in order to improve overall QOL & return to PLOF 1/27/25 46 progressing  2. Patient will report an overall decrease in pain with ADL's and functional mobility Progressing  3.Patient will increase strength by at least 1/2 muscle grade in affected musculature to improve functional mobility  4. Patient will improve cervical ROM to improve functional mobility and ACTIVITIES OF DAILY LIVING's Progressing  5.Patient will be more aware of posture throughout the day to reduce stress  and maintain optimal alignment of the spine Progressing  6. Patient will be independent with HEP and pain Progressing    Plan     Plan of care Certification: 1/2/2025 to 3/14/2025.     Outpatient Physical Therapy 2 times weekly for 10 weeks to include the following interventions: Manual Therapy, Moist Heat/ Ice, Neuromuscular Re-ed, Patient Education, Self Care, Therapeutic Activities, Therapeutic Exercise, and Ultrasound, ASTYM, Kinesiotaping PRN, Functional Dry Needling      Lidia Alvarado, PT

## 2025-02-14 ENCOUNTER — PATIENT MESSAGE (OUTPATIENT)
Dept: NEUROSURGERY | Facility: CLINIC | Age: 65
End: 2025-02-14
Payer: MEDICARE

## 2025-02-14 ENCOUNTER — TELEPHONE (OUTPATIENT)
Dept: NEUROSURGERY | Facility: CLINIC | Age: 65
End: 2025-02-14
Payer: MEDICARE

## 2025-02-14 NOTE — TELEPHONE ENCOUNTER
I have attempted to contact this patient due to appt scheduled 02/28 that has been reschedule due to the provider being out of the office. She has been rescheduled for 03/06 at 12:45 PM for x-rays and 1 PM follow-up. The pt did not answer but I have left a detailed message and portal message.

## 2025-02-19 ENCOUNTER — CLINICAL SUPPORT (OUTPATIENT)
Dept: REHABILITATION | Facility: HOSPITAL | Age: 65
End: 2025-02-19
Payer: MEDICARE

## 2025-02-19 DIAGNOSIS — R52 PAIN: Primary | ICD-10-CM

## 2025-02-19 DIAGNOSIS — R53.1 WEAKNESS: ICD-10-CM

## 2025-02-19 PROCEDURE — 97112 NEUROMUSCULAR REEDUCATION: CPT | Mod: KX

## 2025-02-19 PROCEDURE — 97140 MANUAL THERAPY 1/> REGIONS: CPT | Mod: KX

## 2025-02-19 NOTE — PROGRESS NOTES
[facility]   Physical Therapy Treatment Note      Name: Elina Franco  Clinic Number: [id number]    Therapy Diagnosis:   Encounter Diagnoses   Name Primary?    Pain Yes    Weakness        Physician: Jadyn Coburn PA-C    Visit Date: 2/19/2025    Physician Orders: PT Eval and Treat   Medical Diagnosis from Referral: S/P cervical spinal fusion  Evaluation Date: 1/2/2025  Authorization Period Expiration: 12/20/2025  Plan of Care Expiration: 3/14/2025  Progress Note Due: 30 days  Visit # / Visits authorized: 9/ 10 + eval  FOTO: 2/3  Precautions: Standard, ACDF C 4-7/ DOS 12/2/24/ soft collar at home and hard collar when she is out of the home, bone stimulator for about 2 weeks    PTA Visit #: 0/5     Time In: 8:03  Time Out: 8:56  Total Billable Time: 23  minutes    Subjective     Pt reports:  soreness all over and stiffness in neck especially L side. Patient reports popping and pain with turning to the L.   She was somewhat compliant with home exercise program.  Response to previous treatment: good  Functional change: increase ROM and functional mobility    Pain: 2/ 10  Location: bilateral neck and mostly L UT    Objective    1/27/25 Cervical AROM flexion 30, extension 40, cervical rotation 55 B, with mild discomfort  1/27/25 FOTO completed    Treatment     Elina received the following treatments:    Elina received the following manual therapy techniques applied for 14 minutes, including:  STM B UT's / TP  Stretching to pectoralis musculature    deferred  Gentle joint mobs to L shoulder  Scapula mobs    Elina received therapeutic exercises to develop strength, endurance, ROM, flexibility, posture, and core stabilization for NC minutes including:  UBE for upright posture,  ROM, strength, and endurance x 3' F/ 3' B R:3  Corner wall stretch 3 x 30 secs    deferred  1/2 foam stretch thoracic/ stretch 2 minutes  Supine shoulder flexion 2 x 10 reps with 5 sec hold using a cane  Cervical  AROM          Elina participated in neuromuscular re-education activities to improve: Balance, Coordination, Kinesthetic, Sense, Proprioception, and Posture for 9 minutes., including:  Prone neck retraction 3 x 10 reps  Prone W's 3 x 10 reps  Supine serratus anterior punches 3 x 10 reps 2#  Shoulder shrugs 2# 3 x 10 reps  Shoulder rolls 2 x 10 reps 2#    Wall slides 1 x 10 reps with 10 sec hold  Open books with 10 sec hold 1 x 10 reps each way with cervical rotation    deferred  Chin tucks 3 x 10 reps  Supine nods up and down 3 x 10 reps  Cervical rotation 1 x 10 reps each way  Sitting thoracic extension with ball with chin tucks     Wall push ups with a plus 3 x 10 reps  Cervical isometrics 1 x 10 reps each direction        Supine W slides 3 x 10 reps  Supine DNF 2 x 10 reps  Scapula retraction with Y TB 3 x 10 reps in standing  Shoulder extension with Y TB 2 x 10 reps to open posture in standing      Elina participated in dynamic functional therapeutic activities to improve functional performance for 0 minutes, including:  Sitting with upright posture chest pulls with red  TB 2 x 10 reps  Sitting with upright posture diagonals with red TB 2 x 10 reps  Shoulder press 2# 2 x 10 reps  TRX low rows 2 x 10 reps  Rebounder with weighted red ball overhead 15 throws    Patient Education and Home Exercises       Education provided:   - HEP reviewed    Written Home Exercises Provided: Patient instructed to cont prior HEP. Exercises were reviewed and Elina was able to demonstrate them prior to the end of the session.  Elina demonstrated good  understanding of the education provided. See EMR under Patient Instructions for exercises provided during therapy sessions    Assessment   Patient is 11 weeks post op. She presents to clinic with mild pain, stiffness, and soreness all over. Pain especially turning to the L. Patient has muscle tension in neck musculature. She responded well to treatment with decrease muscle tension and  increase ROM. PT plans to d/c  patient next visit.       Elina Is progressing well towards her goals.   Pt prognosis is Good.     Pt will continue to benefit from skilled outpatient physical therapy to address the deficits listed in the problem list box on initial evaluation, provide pt/family education and to maximize pt's level of independence in the home and community environment.     Pt's spiritual, cultural and educational needs considered and pt agreeable to plan of care and goals.     Anticipated barriers to physical therapy: none    Goals: STG's 2 weeks  Patient will be independent with 50% of HEP MET 1/27/25     LTG's 10 weeks  1.Patient will improve FOTO functional measure score  to 52 % in order to improve overall QOL & return to PLOF 1/27/25 46 progressing  2. Patient will report an overall decrease in pain with ADL's and functional mobility Progressing  3.Patient will increase strength by at least 1/2 muscle grade in affected musculature to improve functional mobility  4. Patient will improve cervical ROM to improve functional mobility and ACTIVITIES OF DAILY LIVING's Progressing  5.Patient will be more aware of posture throughout the day to reduce stress  and maintain optimal alignment of the spine Progressing  6. Patient will be independent with HEP and pain Progressing    Plan     Plan of care Certification: 1/2/2025 to 3/14/2025.     Outpatient Physical Therapy 2 times weekly for 10 weeks to include the following interventions: Manual Therapy, Moist Heat/ Ice, Neuromuscular Re-ed, Patient Education, Self Care, Therapeutic Activities, Therapeutic Exercise, and Ultrasound, ASTYM, Kinesiotaping PRN, Functional Dry Needling      Lidia Alvarado, PT

## 2025-02-20 ENCOUNTER — CLINICAL SUPPORT (OUTPATIENT)
Dept: REHABILITATION | Facility: HOSPITAL | Age: 65
End: 2025-02-20
Payer: MEDICARE

## 2025-02-20 DIAGNOSIS — R52 PAIN: Primary | ICD-10-CM

## 2025-02-20 DIAGNOSIS — R53.1 WEAKNESS: ICD-10-CM

## 2025-02-20 PROCEDURE — 97112 NEUROMUSCULAR REEDUCATION: CPT | Mod: KX

## 2025-02-20 PROCEDURE — 97110 THERAPEUTIC EXERCISES: CPT | Mod: KX

## 2025-02-20 PROCEDURE — 97530 THERAPEUTIC ACTIVITIES: CPT | Mod: KX

## 2025-02-20 PROCEDURE — 97140 MANUAL THERAPY 1/> REGIONS: CPT | Mod: KX

## 2025-02-20 NOTE — PROGRESS NOTES
[facility]   Physical Therapy Treatment Note / d/c summary     Name: Elina Franco  Clinic Number: [id number]    Therapy Diagnosis:   Encounter Diagnoses   Name Primary?    Pain Yes    Weakness        Physician: Jadyn Coburn PA-C    Visit Date: 2/20/2025    Physician Orders: PT Eval and Treat   Medical Diagnosis from Referral: S/P cervical spinal fusion  Evaluation Date: 1/2/2025  Authorization Period Expiration: 12/20/2025  Plan of Care Expiration: 3/14/2025  Progress Note Due: 30 days  Visit # / Visits authorized: 10/ 10 + eval  FOTO: 2/3  Precautions: Standard, ACDF C 4-7/ DOS 12/2/24/ soft collar at home and hard collar when she is out of the home, bone stimulator for about 2 weeks    PTA Visit #: 0/5     Time In: 1:04  Time Out: 2:00  Total Billable Time: 54  minutes    Subjective     Pt reports:  she is feeling better. F/u with Dr. Morales 3/6/25  She was somewhat compliant with home exercise program.  Response to previous treatment: good  Functional change: increase ROM and functional mobility    Pain: 1/ 10  Location: bilateral neck and mostly L UT    Objective    2/20/25 Cervical AROM flexion 45, extension 50, cervical rotation 60 B, mild discomfort to the L, MMT R IR 5/5, ER 4+/5, L ER/ IR 4+/5    1/27/25 Cervical AROM flexion 30, extension 40, cervical rotation 55 B, with mild discomfort  1/27/25 FOTO completed    Treatment     Elina received the following treatments:    Elina received the following manual therapy techniques applied for 10 minutes, including:  STM B UT's / TP  Stretching to pectoralis musculature        Elina received therapeutic exercises to develop strength, endurance, ROM, flexibility, posture, and core stabilization for 10 minutes including:  UBE for upright posture,  ROM, strength, and endurance x 3' F/ 3' B R:3  AROM  MMT            Elina participated in neuromuscular re-education activities to improve: Balance, Coordination, Kinesthetic, Sense,  Proprioception, and Posture for 19 minutes., including:  Prone neck retraction 3 x 10 reps  Prone W's 3 x 10 reps  Supine serratus anterior punches 3 x 10 reps 2#  Shoulder shrugs 2# 3 x 10 reps  Shoulder rolls 2 x 10 reps 2#  Scapula retraction with R TB 3 x 10 reps      Elina participated in dynamic functional therapeutic activities to improve functional performance for 15 minutes, including:   chest pulls/ diagonals with red  TB alternating pattern  2 x 10 reps  Shoulder press 2# 3 x 10 reps  HEP reviewed and red and green TB issued    Patient Education and Home Exercises       Education provided:   - HEP reviewed    Written Home Exercises Provided: Patient instructed to cont prior HEP. Exercises were reviewed and Elina was able to demonstrate them prior to the end of the session.  Elina demonstrated good  understanding of the education provided. See EMR under Patient Instructions for exercises provided during therapy sessions    Assessment   Patient is 11 weeks post op. She is feeling better overall with minimal neck pain. Patient has met goals set on initial evaluation. Patient is independent with HEP. Cervical AROM flexion 45, extension 50, cervical rotation 60 B, mild discomfort to the L, MMT R IR 5/5, ER 4+/5, L ER/ IR 4+/5. FOTO score has improved. Patient is being d/c from PT services and patient is in agreement.         Goals: STG's 2 weeks  Patient will be independent with 50% of HEP MET 1/27/25     LTG's 10 weeks  1.Patient will improve FOTO functional measure score  to 52 % in order to improve overall QOL & return to PLOF 1/27/25 46 MET 70 2/20/25  2. Patient will report an overall decrease in pain with ADL's and functional mobility MET 2/20/25  3.Patient will increase strength by at least 1/2 muscle grade in affected musculature to improve functional mobility MET 2/20/25  4. Patient will improve cervical ROM to improve functional mobility and ACTIVITIES OF DAILY LIVING's MET 2/20/25  5.Patient will be  more aware of posture throughout the day to reduce stress  and maintain optimal alignment of the spine MET 2/20/25  6. Patient will be independent with HEP and pain MET 2/20/25    Plan     D/c from PT services     Lidia Alvarado, PT

## 2025-03-01 ENCOUNTER — PATIENT MESSAGE (OUTPATIENT)
Dept: NEUROSURGERY | Facility: CLINIC | Age: 65
End: 2025-03-01
Payer: MEDICARE

## 2025-03-06 ENCOUNTER — OFFICE VISIT (OUTPATIENT)
Dept: NEUROSURGERY | Facility: CLINIC | Age: 65
End: 2025-03-06
Attending: PHYSICIAN ASSISTANT
Payer: MEDICARE

## 2025-03-06 ENCOUNTER — HOSPITAL ENCOUNTER (OUTPATIENT)
Dept: RADIOLOGY | Facility: HOSPITAL | Age: 65
Discharge: HOME | End: 2025-03-06
Attending: PHYSICIAN ASSISTANT
Payer: MEDICARE

## 2025-03-06 VITALS — HEIGHT: 66 IN | WEIGHT: 151.69 LBS | BODY MASS INDEX: 24.38 KG/M2

## 2025-03-06 DIAGNOSIS — M54.9 DORSALGIA, UNSPECIFIED: ICD-10-CM

## 2025-03-06 DIAGNOSIS — Z98.1 S/P CERVICAL SPINAL FUSION: ICD-10-CM

## 2025-03-06 DIAGNOSIS — M41.50 SCOLIOSIS DUE TO DEGENERATIVE DISEASE OF SPINE IN ADULT PATIENT: ICD-10-CM

## 2025-03-06 DIAGNOSIS — M43.16 SPONDYLOLISTHESIS, LUMBAR REGION: Primary | ICD-10-CM

## 2025-03-06 DIAGNOSIS — M51.362 DEGENERATION OF INTERVERTEBRAL DISC OF LUMBAR REGION WITH DISCOGENIC BACK PAIN AND LOWER EXTREMITY PAIN: ICD-10-CM

## 2025-03-06 PROCEDURE — 99213 OFFICE O/P EST LOW 20 MIN: CPT | Mod: PBBFAC,25 | Performed by: NEUROLOGICAL SURGERY

## 2025-03-06 PROCEDURE — 72040 X-RAY EXAM NECK SPINE 2-3 VW: CPT | Mod: TC

## 2025-03-06 PROCEDURE — 72040 X-RAY EXAM NECK SPINE 2-3 VW: CPT | Mod: 26,,, | Performed by: RADIOLOGY

## 2025-03-06 PROCEDURE — 99999 PR PBB SHADOW E&M-EST. PATIENT-LVL III: CPT | Mod: PBBFAC,,, | Performed by: NEUROLOGICAL SURGERY

## 2025-03-06 RX ORDER — BACLOFEN 10 MG/1
10 TABLET ORAL 3 TIMES DAILY PRN
Qty: 90 TABLET | Refills: 0 | Status: SHIPPED | OUTPATIENT
Start: 2025-03-06

## 2025-03-06 NOTE — PROGRESS NOTES
Subjective:      Patient ID: Elina Franco is a 64 y.o. female.    Chief Complaint: S/P cervical spinal fusion (Pt reports 3 months s/p cervical fx; Pt reports overall doing well but does have some new symptoms; Pt reports ache under Rt ear when lying down as well as difficulty turning to the LT side; Pt reports some pin and needle sensation in LT shoulder area; Pt has cervical XR performed today)    Pt reports for f/u after ACDF three-month postop visit  Pain = 2/10  Meds = Tizanadine PRN.   Overall doing well  States she has some spasms bilateral shoulders and will take Flexeril however this is not working as well  She is here today with an x-ray cervical spine for my review  She is here today more complaining of her lower back which we addressed prior however we agreed to review her cervical spine prior to evaluating  Her lower back issues    Lower back and bilateral lower extremity symptoms she has a history of thoracolumbar degenerative scoliotic disease  Worse with activity and better with rest  She does note that overall his symptoms have improved following cervical fusion however was enquiring about reviewing lumbar spine    Previous Notes:  Post-op Evaluation (Pt reports for f/u after ACDF 12/02/24;    Patient here today for follow-up recently had cervical ACDF C4-7  Patient is 6 weeks postop she is doing well she has been working with physical therapy and notes improvement of range of motion she continues to have left-sided tightness in spasming along her neck posteriorly and into her left shoulder and along trapezius musculature she is no longer wearing collar.  She is ambulatory without assistive device she reports incision better healing does feel as though sometimes has something in her throat but overall swallowing well and no issues with liquids or solids.   She presents today with x-rays of her cervical spine which we reviewed together the x-rays show instrumentation in appropriate position no shift  and no acute abnormalities appreciated   Denies any weakness or numbness and tingling   No fever/ drainage from incision  Patient has no bowel bladder dysfunction  She does report difficulty sleeping  She has not been taking her muscle relaxers              Review of Systems   Constitutional:  Negative for activity change, appetite change and chills.   HENT:  Negative for congestion and ear pain.    Eyes:  Negative for photophobia, redness and visual disturbance.   Respiratory:  Negative for apnea and chest tightness.    Cardiovascular:  Negative for chest pain.   Gastrointestinal:  Negative for abdominal distention and abdominal pain.   Endocrine: Negative for cold intolerance.   Genitourinary:  Negative for difficulty urinating.   Musculoskeletal:  Positive for myalgias, neck pain and neck stiffness. Negative for arthralgias.   Skin:  Negative for color change.   Allergic/Immunologic: Negative for environmental allergies.   Neurological:  Negative for dizziness, weakness and numbness.   Hematological:  Negative for adenopathy. Does not bruise/bleed easily.   Psychiatric/Behavioral:  Negative for agitation, behavioral problems and confusion.          Objective:       Physical Exam:  Nursing note and vitals reviewed.    Constitutional: She appears well-nourished. No distress.     Eyes: EOM are normal.     Cardiovascular: Normal rate.     Psych/Behavior: She is alert. She is oriented to person, place, and time. She has a normal mood and affect.     Musculoskeletal:        Neck: Range of motion is limited. There is tenderness. Muscle strength is 5/5.        Right Upper Extremities: There is no tenderness. Muscle strength is 5/5.        Left Upper Extremities: There is no tenderness. Muscle strength is 5/5.     Neurological:        Sensory: There is no sensory deficit in the trunk. There is no sensory deficit in the extremities.        Cranial nerves: Cranial nerve(s) II, III, IV, V, VI, VII, VIII, IX, X, XI and XII  are intact.     General    Nursing note and vitals reviewed.  Constitutional: She is oriented to person, place, and time. She appears well-nourished. No distress.   Eyes: EOM are normal.   Cardiovascular:  Normal rate.            Neurological: She is alert and oriented to person, place, and time.   Psychiatric: She has a normal mood and affect.             Ortho Exam    X-ray shows instrumentation in place C4-C7 without any hardware complication identified        I  reviewed all pertinent imaging regarding this case.  Assessment:     1. Spondylolisthesis, lumbar region    2. Dorsalgia, unspecified    3. Scoliosis due to degenerative disease of spine in adult patient    4. Degeneration of intervertebral disc of lumbar region with discogenic back pain and lower extremity pain      Plan:     Spondylolisthesis, lumbar region    Dorsalgia, unspecified    Scoliosis due to degenerative disease of spine in adult patient    Degeneration of intervertebral disc of lumbar region with discogenic back pain and lower extremity pain    Other orders  -     baclofen (LIORESAL) 10 MG tablet; Take 1 tablet (10 mg total) by mouth 3 (three) times daily as needed (muscle spams).  Dispense: 90 tablet; Refill: 0    Patient doing well from cervical fusion  I would like to change her muscle spasm medication as the symptoms she is relating are more likely from muscle spasms\  She would like to start process of evaluating her lumbar spine where she has degenerative thoracolumbar scoliosis with spondylolisthesis  MRI lumbar spine without contrast ordered and we will follow up in 3 months with imaging       Thank you for the referral   Please call with any questions    Solis Morales MD  Neurosurgery     Disclaimer: This note was prepared using a voice recognition system and is likely to have sound alike errors within the text.

## 2025-03-11 ENCOUNTER — PATIENT MESSAGE (OUTPATIENT)
Dept: NEUROSURGERY | Facility: CLINIC | Age: 65
End: 2025-03-11
Payer: MEDICARE

## 2025-03-14 DIAGNOSIS — M54.9 DORSALGIA, UNSPECIFIED: Primary | ICD-10-CM

## 2025-03-14 RX ORDER — DIAZEPAM 5 MG/1
5 TABLET ORAL
Qty: 1 TABLET | Refills: 0 | Status: SHIPPED | OUTPATIENT
Start: 2025-03-14

## 2025-03-25 ENCOUNTER — HOSPITAL ENCOUNTER (OUTPATIENT)
Dept: RADIOLOGY | Facility: HOSPITAL | Age: 65
Discharge: HOME | End: 2025-03-25
Attending: NEUROLOGICAL SURGERY
Payer: MEDICARE

## 2025-03-25 DIAGNOSIS — M43.16 SPONDYLOLISTHESIS, LUMBAR REGION: ICD-10-CM

## 2025-03-25 DIAGNOSIS — M54.9 DORSALGIA, UNSPECIFIED: ICD-10-CM

## 2025-03-25 PROCEDURE — 72148 MRI LUMBAR SPINE W/O DYE: CPT | Mod: 26,,, | Performed by: RADIOLOGY

## 2025-03-25 PROCEDURE — 72148 MRI LUMBAR SPINE W/O DYE: CPT | Mod: TC

## 2025-04-25 ENCOUNTER — OFFICE VISIT (OUTPATIENT)
Dept: NEUROSURGERY | Facility: CLINIC | Age: 65
End: 2025-04-25
Payer: MEDICARE

## 2025-04-25 VITALS
WEIGHT: 151.69 LBS | HEIGHT: 66 IN | HEART RATE: 81 BPM | BODY MASS INDEX: 24.38 KG/M2 | SYSTOLIC BLOOD PRESSURE: 126 MMHG | DIASTOLIC BLOOD PRESSURE: 72 MMHG

## 2025-04-25 DIAGNOSIS — M46.1 SI JOINT ARTHRITIS: Primary | ICD-10-CM

## 2025-04-25 DIAGNOSIS — M41.50 SCOLIOSIS DUE TO DEGENERATIVE DISEASE OF SPINE IN ADULT PATIENT: ICD-10-CM

## 2025-04-25 PROCEDURE — 99999 PR PBB SHADOW E&M-EST. PATIENT-LVL III: CPT | Mod: PBBFAC,,, | Performed by: NEUROLOGICAL SURGERY

## 2025-04-25 PROCEDURE — 99213 OFFICE O/P EST LOW 20 MIN: CPT | Mod: PBBFAC | Performed by: NEUROLOGICAL SURGERY

## 2025-04-25 NOTE — PROGRESS NOTES
Subjective:      Patient ID: Elina Franco is a 64 y.o. female.    Chief Complaint: Spondylolisthesis, lumbar region (Pt reports for f/u of lumbar spine pain; Pt reports pain in back radiates to harley hip R>L; Pt reports tingling in both feet; Pt has MR lumbar for review today)    Pt 4mo post ACDF. Occasional tightness in neck and stiffness in L UE but overall still doing well.  MRI Lumbar for review.  Pain goes down the right side lateral aspect of the leg  Meds = Gabapentin, Baclofen (PRN).  Pain = 4/10    Previous HPI:  Pt reports for f/u after ACDF three-month postop visit  Pain = 2/10  Meds = Tizanadine PRN.   Overall doing well  States she has some spasms bilateral shoulders and will take Flexeril however this is not working as well  She is here today with an x-ray cervical spine for my review  She is here today more complaining of her lower back which we addressed prior however we agreed to review her cervical spine prior to evaluating  Her lower back issues    Lower back and bilateral lower extremity symptoms she has a history of thoracolumbar degenerative scoliotic disease  Worse with activity and better with rest  She does note that overall his symptoms have improved following cervical fusion however was enquiring about reviewing lumbar spine    Previous Notes:  Post-op Evaluation (Pt reports for f/u after ACDF 12/02/24;    Patient here today for follow-up recently had cervical ACDF C4-7  Patient is 6 weeks postop she is doing well she has been working with physical therapy and notes improvement of range of motion she continues to have left-sided tightness in spasming along her neck posteriorly and into her left shoulder and along trapezius musculature she is no longer wearing collar.  She is ambulatory without assistive device she reports incision better healing does feel as though sometimes has something in her throat but overall swallowing well and no issues with liquids or solids.   She presents today  with x-rays of her cervical spine which we reviewed together the x-rays show instrumentation in appropriate position no shift and no acute abnormalities appreciated   Denies any weakness or numbness and tingling   No fever/ drainage from incision  Patient has no bowel bladder dysfunction  She does report difficulty sleeping  She has not been taking her muscle relaxers              Review of Systems   Constitutional:  Negative for activity change, appetite change and chills.   HENT:  Negative for congestion and ear pain.    Eyes:  Negative for photophobia, redness and visual disturbance.   Respiratory:  Negative for apnea and chest tightness.    Cardiovascular:  Negative for chest pain.   Gastrointestinal:  Negative for abdominal distention and abdominal pain.   Endocrine: Negative for cold intolerance.   Genitourinary:  Negative for difficulty urinating.   Musculoskeletal:  Positive for myalgias, neck pain and neck stiffness. Negative for arthralgias.   Skin:  Negative for color change.   Allergic/Immunologic: Negative for environmental allergies.   Neurological:  Negative for dizziness, weakness and numbness.   Hematological:  Negative for adenopathy. Does not bruise/bleed easily.   Psychiatric/Behavioral:  Negative for agitation, behavioral problems and confusion.          Objective:       Physical Exam:  Nursing note and vitals reviewed.    Constitutional: She appears well-nourished. No distress.     Eyes: EOM are normal.     Cardiovascular: Normal rate.     Psych/Behavior: She is alert. She is oriented to person, place, and time. She has a normal mood and affect.     Musculoskeletal:        Neck: Range of motion is limited. There is tenderness. Muscle strength is 5/5.        Right Upper Extremities: There is no tenderness. Muscle strength is 5/5.        Left Upper Extremities: There is no tenderness. Muscle strength is 5/5.     Neurological:        Sensory: There is no sensory deficit in the trunk. There is no  sensory deficit in the extremities.        Cranial nerves: Cranial nerve(s) II, III, IV, V, VI, VII, VIII, IX, X, XI and XII are intact.     General    Nursing note and vitals reviewed.  Constitutional: She is oriented to person, place, and time. She appears well-nourished. No distress.   Eyes: EOM are normal.   Cardiovascular:  Normal rate.            Neurological: She is alert and oriented to person, place, and time.   Psychiatric: She has a normal mood and affect.             Ortho Exam    X-ray shows instrumentation in place C4-C7 without any hardware complication identified        I  reviewed all pertinent imaging regarding this case.  Assessment:     1. SI joint arthritis    2. Scoliosis due to degenerative disease of spine in adult patient        Plan:     SI joint arthritis  -     Procedure Request Order for Pain Management; Future; Expected date: 04/25/2025    Scoliosis due to degenerative disease of spine in adult patient        Patient here today for follow up she has a new MRI to review  This MRI was done lying down as opposed to her previous MRI which was done in a seated position  On this image her lumbar lordosis is better appreciated prior there was an L4-5 disc and mild spondylolisthesis which is now improved compared to her last image  Her pain is localized on the right side paraspinous into the hip it was reproduced on P ROM as well as positive compression test   This would be more consistent with an SI joint type tenderness  She has a slight leg length discrepancy which can be appreciated on her standing x-ray survey which was done in 2023  Symptoms have been persistent for some time not getting better with medications she has previously done therapy for this in the past I do think it would be worthwhile to get her set up for a right-sided SI joint injection to see if this will alleviate her symptoms and would help us delineate her main pain generator  She does have degenerative changes to her  spine without any severe foraminal stenosis on the right side  Type 1 Modic changes L2-3  Foramen appear open throughout    We will set her up with the pain clinic for right-sided SI joint injection and have her follow up in clinic 2 weeks after to see how her symptoms are doing      Thank you for the referral   Please call with any questions    Solis Morales MD  Neurosurgery     Disclaimer: This note was prepared using a voice recognition system and is likely to have sound alike errors within the text.

## 2025-04-28 DIAGNOSIS — M46.1 SACROILIITIS: Primary | ICD-10-CM

## 2025-05-05 NOTE — PRE-PROCEDURE INSTRUCTIONS
Spoke with patient regarding procedure scheduled on 5.12     Arrival time 0715     Has patient been sick with fever or on antibiotics within the last 7 days? No     Does the patient have any open wounds, sores or rashes? No     Does the patient have any recent fractures? no     Has patient received a vaccination within the last 7 days? No     Received the COVID vaccination?      Has the patient stopped all medications as directed? na     Does patient have a pacemaker, defibrillator, or implantable stimulator? No     Does the patient have a ride to and from procedure and someone reliable to remain with patient?       Is the patient diabetic? yes     Does the patient have sleep apnea? Or use O2 at home? griselda on cpap     Is the patient receiving sedation?      Is the patient instructed to remain NPO beginning at midnight the night before their procedure? yes     Procedure location confirmed with patient? Yes     Covid- Denies signs/symptoms. Instructed to notify PAT/MD if any changes.

## 2025-05-07 ENCOUNTER — TELEPHONE (OUTPATIENT)
Dept: PAIN MEDICINE | Facility: CLINIC | Age: 65
End: 2025-05-07
Payer: MEDICARE

## 2025-05-07 NOTE — TELEPHONE ENCOUNTER
Returned call to patient regarding message to reschedule procedure on 5/12/25, no answer left message to return call

## 2025-05-07 NOTE — TELEPHONE ENCOUNTER
----- Message from Summer sent at 5/7/2025  2:32 PM CDT -----  Contact: Elina  Type:  Patient Returning CallWho Called: Elina Who Left Message for Patient: LESA STEPHENSON Does the patient know what this is regarding?: Rescheduling injection Would the patient rather a call back or a response via [email or web address]? Call back Best Call Back Number: [phone] Information:

## 2025-05-07 NOTE — TELEPHONE ENCOUNTER
Returned call to patient regarding rescheduling procedure from 5/12/25 to 5/26/25.  Procedure has been rescheduled.

## 2025-05-20 NOTE — PRE-PROCEDURE INSTRUCTIONS
Spoke with patient regarding procedure scheduled on 5.26     Arrival time 0800     Has patient been sick with fever or on antibiotics within the last 7 days? No     Does the patient have any open wounds, sores or rashes? No     Does the patient have any recent fractures? no     Has patient received a vaccination within the last 7 days? No     Received the COVID vaccination? yes     Has the patient stopped all medications as directed? na     Does patient have a pacemaker, defibrillator, or implantable stimulator? No     Does the patient have a ride to and from procedure and someone reliable to remain with patient?       Is the patient diabetic? yes     Does the patient have sleep apnea? Or use O2 at home? griselda cpap     Is the patient receiving sedation?      Is the patient instructed to remain NPO beginning at midnight the night before their procedure? yes     Procedure location confirmed with patient? Yes     Covid- Denies signs/symptoms. Instructed to notify PAT/MD if any changes.

## 2025-05-26 ENCOUNTER — HOSPITAL ENCOUNTER (OUTPATIENT)
Facility: HOSPITAL | Age: 65
Discharge: HOME | End: 2025-05-26
Attending: ANESTHESIOLOGY | Admitting: ANESTHESIOLOGY
Payer: MEDICARE

## 2025-05-26 VITALS
OXYGEN SATURATION: 96 % | TEMPERATURE: 98 F | HEART RATE: 63 BPM | BODY MASS INDEX: 24.54 KG/M2 | WEIGHT: 152.69 LBS | DIASTOLIC BLOOD PRESSURE: 58 MMHG | HEIGHT: 66 IN | SYSTOLIC BLOOD PRESSURE: 108 MMHG | RESPIRATION RATE: 16 BRPM

## 2025-05-26 DIAGNOSIS — M46.1 SACROILIITIS: Primary | ICD-10-CM

## 2025-05-26 LAB — POCT GLUCOSE: 118 MG/DL (ref 70–110)

## 2025-05-26 PROCEDURE — 27096 INJECT SACROILIAC JOINT: CPT | Mod: RT,,, | Performed by: ANESTHESIOLOGY

## 2025-05-26 PROCEDURE — 25500020 PHARM REV CODE 255: Performed by: ANESTHESIOLOGY

## 2025-05-26 PROCEDURE — 27096 INJECT SACROILIAC JOINT: CPT | Mod: RT | Performed by: ANESTHESIOLOGY

## 2025-05-26 PROCEDURE — 63600175 PHARM REV CODE 636 W HCPCS: Mod: JZ,TB | Performed by: ANESTHESIOLOGY

## 2025-05-26 PROCEDURE — 82962 GLUCOSE BLOOD TEST: CPT | Performed by: ANESTHESIOLOGY

## 2025-05-26 RX ORDER — METHYLPREDNISOLONE ACETATE 40 MG/ML
INJECTION, SUSPENSION INTRA-ARTICULAR; INTRALESIONAL; INTRAMUSCULAR; SOFT TISSUE
Status: DISCONTINUED | OUTPATIENT
Start: 2025-05-26 | End: 2025-05-26 | Stop reason: HOSPADM

## 2025-05-26 RX ORDER — ONDANSETRON HYDROCHLORIDE 2 MG/ML
4 INJECTION, SOLUTION INTRAVENOUS ONCE AS NEEDED
Status: DISCONTINUED | OUTPATIENT
Start: 2025-05-26 | End: 2025-05-26 | Stop reason: HOSPADM

## 2025-05-26 RX ORDER — FENTANYL CITRATE 50 UG/ML
INJECTION, SOLUTION INTRAMUSCULAR; INTRAVENOUS
Status: DISCONTINUED | OUTPATIENT
Start: 2025-05-26 | End: 2025-05-26 | Stop reason: HOSPADM

## 2025-05-26 RX ORDER — LIDOCAINE HYDROCHLORIDE 10 MG/ML
INJECTION, SOLUTION EPIDURAL; INFILTRATION; INTRACAUDAL; PERINEURAL
Status: DISCONTINUED | OUTPATIENT
Start: 2025-05-26 | End: 2025-05-26 | Stop reason: HOSPADM

## 2025-05-26 RX ORDER — MIDAZOLAM HYDROCHLORIDE 1 MG/ML
INJECTION, SOLUTION INTRAMUSCULAR; INTRAVENOUS
Status: DISCONTINUED | OUTPATIENT
Start: 2025-05-26 | End: 2025-05-26 | Stop reason: HOSPADM

## 2025-05-26 NOTE — DISCHARGE SUMMARY
Discharge Note  Short Stay      SUMMARY     Admit Date: 5/26/2025    Attending Physician: Fish Claudio MD        Discharge Physician: Fish Claudio MD        Discharge Date: 5/26/2025 9:15 AM    Procedure(s) (LRB):  Right SIJ Injection (Right)    Final Diagnosis: Sacroiliitis [M46.1]    Disposition: Home or self care    Patient Instructions:   Current Discharge Medication List        CONTINUE these medications which have NOT CHANGED    Details   gabapentin (NEURONTIN) 300 MG capsule Take 600 mg by mouth 2 (two) times daily.      irbesartan (AVAPRO) 75 MG tablet Take 75 mg by mouth every evening.      pantoprazole (PROTONIX) 40 MG tablet Take 40 mg by mouth once daily.      rosuvastatin (CRESTOR) 40 MG Tab Take 10 mg by mouth every evening.      sertraline (ZOLOFT) 50 MG tablet Take 50 mg by mouth once daily.      zolpidem (AMBIEN) 10 mg Tab Take 5 mg by mouth nightly as needed.      baclofen (LIORESAL) 10 MG tablet Take 1 tablet (10 mg total) by mouth 3 (three) times daily as needed (muscle spams).  Qty: 90 tablet, Refills: 0      diazePAM (VALIUM) 5 MG tablet Take 1 tablet (5 mg total) by mouth On call Procedure (prior to imaging study).  Qty: 1 tablet, Refills: 0    Associated Diagnoses: Dorsalgia, unspecified      oxyCODONE-acetaminophen (PERCOCET)  mg per tablet Take 1 tablet by mouth every 8 (eight) hours as needed for Pain.  Qty: 30 tablet, Refills: 0    Associated Diagnoses: S/P cervical spinal fusion      tirzepatide (MOUNJARO) 7.5 mg/0.5 mL PnIj Inject 7.5 mg into the skin every 7 days.                 Discharge Diagnosis: Sacroiliitis [M46.1]  Condition on Discharge: Stable with no complications to procedure   Diet on Discharge: Same as before.  Activity: as per instruction sheet.  Discharge to: Home with a responsible adult.  Follow up: 2-4 weeks       Please call the office at [phone] if you experience any weakness or loss of sensation, fever > 101.5, pain uncontrolled with oral  medications, persistent nausea/vomiting/or diarrhea, redness or drainage from the incisions, or any other worrisome concerns. If physician on call was not reached or could not communicate with our office for any reason please go to the nearest emergency department

## 2025-05-26 NOTE — OP NOTE
Sacroiliac Joint Injection under Fluoroscopy    INFORMED CONSENT: The procedure, risks, benefits and options were discussed with patient. There are no contraindications to the procedure. The patient expressed understanding and agreed to proceed. The personnel performing the procedure was discussed.    Date of procedure 05/26/2025    Time-out taken to identify patient and procedure side prior to starting the procedure.                                                                  PROCEDURE:  right sacroiliac joint injection under fluoroscopy.    Pre Procedure diagnosis:    Sacroiliitis [M46.1]  1. Sacroiliitis        Post-Procedure diagnosis:   same    PHYSICIAN: Fish Claudio MD    ASSISTANTS: None    MEDICATIONS INJECTED: 1ML Depo-Medrol 40mg and 3ml of Lidocaine PF 1%     LOCAL ANESTHETIC USED: 3ml Lidocaine 1%      Sedation: Conscious sedation provided by M.D    SEDATION MEDICATIONS: local/IV sedation: Versed 2 mg and fentanyl 50 mcg IV.  Conscious sedation ordered by MD.  Patient reevaluated and sedation administered by MD and monitored by RN.  Total sedation time was less than 10 min.    Total sedation time was <10 min    ESTIMATED BLOOD LOSS:  None.    COMPLICATIONS:  None.    TECHNIQUE:   Laying in the prone position, the patient was prepped and draped in the usual sterile fashion using ChloraPrep and fenestrated drape.  The area was determined under fluoroscopy.  Local Xylocaine was injected by raising a wheel and going down to the periosteum using a 27-gauge hypodermic needle.  The 3.5 inch 22-gauge spinal needle was introduced into the right sacroiliac joint.  Negative pressure applied to confirm no intravascular placement.  Omnipaque was injected to confirm placement and to confirm that there was no vascular runoff.  The medication was then injected slowly.  The patient tolerated the procedure well.      The patient was monitored for approximately 30 minutes after the procedure.  Patient was given  post procedure and discharge instructions to follow at home.  The patient was discharged in a stable condition

## 2025-05-26 NOTE — DISCHARGE INSTRUCTIONS

## 2025-05-26 NOTE — H&P
HPI  Patient presenting for Procedure(s) (LRB):  Right SIJ Injection (Right)     Patient on Anti-coagulation No    No health changes since previous encounter    Past Medical History:   Diagnosis Date    Anxiety     Hyperlipidemia     Hypertension     Sleep apnea      Past Surgical History:   Procedure Laterality Date    BUNIONECTOMY Left 2018    L great toe and side bunions    CARPAL TUNNEL RELEASE Bilateral     2000    CHOLECYSTECTOMY  2006    DECOMPRESSION OF CERVICAL SPINE BY ANTERIOR APPROACH WITH FUSION N/A 12/2/2024    Procedure: DECOMPRESSION AND FUSION, SPINE, CERVICAL, ANTERIOR APPROACH;  Surgeon: Solis Morales MD;  Location: [facility];  Service: Neurosurgery;  Laterality: N/A;    EPIDURAL STEROID INJECTION INTO CERVICAL SPINE N/A 02/21/2024    Procedure: C6/7 IL JOJO;  Surgeon: Fish Claudio MD;  Location: [facility] PAIN MGT;  Service: Pain Management;  Laterality: N/A;    EPIDURAL STEROID INJECTION INTO CERVICAL SPINE N/A 06/12/2024    Procedure: C6/7 IL JOJO;  Surgeon: Fish Claudio MD;  Location: [facility] PAIN [facility]GT;  Service: Pain Management;  Laterality: N/A;    FUSION OF SPINE WITH INSTRUMENTATION N/A 12/2/2024    Procedure: FUSION, SPINE, WITH INSTRUMENTATION;  Surgeon: Solis Morales MD;  Location: [facility] OR;  Service: Neurosurgery;  Laterality: N/A;    FUSION, SPINE, ANTERIOR USING INTERBODY FUSION SYSTEM N/A 12/2/2024    Procedure: FUSION, SPINE, ANTERIOR USING INTERBODY FUSION SYSTEM;  Surgeon: Solis Morales MD;  Location: [facility] OR;  Service: Neurosurgery;  Laterality: N/A;    FUSION, SPINE, CERVICAL, ANTERIOR APPROACH N/A 12/2/2024    Procedure: FUSION, SPINE, CERVICAL, ANTERIOR APPROACH;  Surgeon: Solis Morales MD;  Location: [facility] OR;  Service: Neurosurgery;  Laterality: N/A;  cervical 4-5-6-7    HYSTERECTOMY  1998    SURGICAL PROCUREMENT,BONE GRAFT,SPINE N/A 12/2/2024    Procedure: SURGICAL PROCUREMENT,BONE GRAFT,SPINE;  Surgeon: Solis Morales MD;  Location: [facility] OR;  Service:  "Neurosurgery;  Laterality: N/A;     Review of patient's allergies indicates:   Allergen Reactions    Statins-hmg-coa reductase inhibitors Swelling and Other (See Comments)     Lower extremity cramp with pravastatin in 2016.    Sulfa (sulfonamide antibiotics)         Medications Ordered Prior to Encounter[1]     PMHx, PSHx, Allergies, Medications reviewed in epic    ROS negative except pain complaints in HPI    OBJECTIVE:    /62   Pulse 76   Temp 97.1 °F (36.2 °C)   Resp 16   Ht 5' 6" (1.676 m)   Wt 69.3 kg (152 lb 10.7 oz)   SpO2 (!) 94%   Breastfeeding No   BMI 24.64 kg/m²     PHYSICAL EXAMINATION:    GENERAL: Well appearing, in no acute distress, alert and oriented x3.  PSYCH:  Mood and affect appropriate.  SKIN: Skin color, texture, turgor normal, no rashes or lesions which will impact the procedure.  CV: RRR with palpation of the radial artery.  PULM: No evidence of respiratory difficulty, symmetric chest rise. Clear to auscultation.  NEURO: Cranial nerves grossly intact.    Plan:    Proceed with procedure as planned Procedure(s) (LRB):  Right SIJ Injection (Right)    Fish Claudio MD  05/26/2025                 [1]   No current facility-administered medications on file prior to encounter.     Current Outpatient Medications on File Prior to Encounter   Medication Sig Dispense Refill    gabapentin (NEURONTIN) 300 MG capsule Take 600 mg by mouth 2 (two) times daily.      irbesartan (AVAPRO) 75 MG tablet Take 75 mg by mouth every evening.      pantoprazole (PROTONIX) 40 MG tablet Take 40 mg by mouth once daily.      rosuvastatin (CRESTOR) 40 MG Tab Take 10 mg by mouth every evening.      sertraline (ZOLOFT) 50 MG tablet Take 50 mg by mouth once daily.      zolpidem (AMBIEN) 10 mg Tab Take 5 mg by mouth nightly as needed.      baclofen (LIORESAL) 10 MG tablet Take 1 tablet (10 mg total) by mouth 3 (three) times daily as needed (muscle spams). 90 tablet 0    diazePAM (VALIUM) 5 MG tablet Take 1 " tablet (5 mg total) by mouth On call Procedure (prior to imaging study). 1 tablet 0    oxyCODONE-acetaminophen (PERCOCET)  mg per tablet Take 1 tablet by mouth every 8 (eight) hours as needed for Pain. 30 tablet 0    tirzepatide (MOUNJARO) 7.5 mg/0.5 mL PnIj Inject 7.5 mg into the skin every 7 days.

## 2025-05-26 NOTE — PLAN OF CARE
Pt had cervical spine surgery 6 months ago with plates and screws. Pt prepared for her procedure,  at bedside.

## 2025-06-05 ENCOUNTER — PATIENT MESSAGE (OUTPATIENT)
Dept: NEUROSURGERY | Facility: CLINIC | Age: 65
End: 2025-06-05
Payer: MEDICARE

## 2025-06-05 DIAGNOSIS — Z98.1 S/P CERVICAL SPINAL FUSION: Primary | ICD-10-CM

## 2025-06-06 ENCOUNTER — HOSPITAL ENCOUNTER (OUTPATIENT)
Dept: RADIOLOGY | Facility: HOSPITAL | Age: 65
Discharge: HOME | End: 2025-06-06
Attending: NEUROLOGICAL SURGERY
Payer: MEDICARE

## 2025-06-06 ENCOUNTER — OFFICE VISIT (OUTPATIENT)
Dept: NEUROSURGERY | Facility: CLINIC | Age: 65
End: 2025-06-06
Payer: MEDICARE

## 2025-06-06 VITALS
WEIGHT: 152.75 LBS | HEIGHT: 66 IN | DIASTOLIC BLOOD PRESSURE: 78 MMHG | SYSTOLIC BLOOD PRESSURE: 124 MMHG | BODY MASS INDEX: 24.55 KG/M2 | HEART RATE: 70 BPM

## 2025-06-06 DIAGNOSIS — M54.12 CERVICAL RADICULOPATHY: ICD-10-CM

## 2025-06-06 DIAGNOSIS — M43.00 SPONDYLOLYSIS: ICD-10-CM

## 2025-06-06 DIAGNOSIS — Z98.1 S/P CERVICAL SPINAL FUSION: ICD-10-CM

## 2025-06-06 DIAGNOSIS — M50.30 DEGENERATIVE DISC DISEASE, CERVICAL: Primary | ICD-10-CM

## 2025-06-06 DIAGNOSIS — M48.062 LUMBAR STENOSIS WITH NEUROGENIC CLAUDICATION: ICD-10-CM

## 2025-06-06 PROCEDURE — 72040 X-RAY EXAM NECK SPINE 2-3 VW: CPT | Mod: TC

## 2025-06-06 PROCEDURE — 99213 OFFICE O/P EST LOW 20 MIN: CPT | Mod: PBBFAC,25 | Performed by: NEUROLOGICAL SURGERY

## 2025-06-06 PROCEDURE — 99999 PR PBB SHADOW E&M-EST. PATIENT-LVL III: CPT | Mod: PBBFAC,,, | Performed by: NEUROLOGICAL SURGERY

## 2025-06-06 PROCEDURE — 72040 X-RAY EXAM NECK SPINE 2-3 VW: CPT | Mod: 26,,, | Performed by: RADIOLOGY

## 2025-06-06 RX ORDER — DIAZEPAM 5 MG/1
5 TABLET ORAL EVERY 12 HOURS PRN
Qty: 20 TABLET | Refills: 0 | Status: SHIPPED | OUTPATIENT
Start: 2025-06-06

## 2025-06-06 RX ORDER — DIAZEPAM 5 MG/1
5 TABLET ORAL
Qty: 1 TABLET | Refills: 0 | Status: SHIPPED | OUTPATIENT
Start: 2025-06-06

## 2025-06-13 ENCOUNTER — CLINICAL SUPPORT (OUTPATIENT)
Dept: REHABILITATION | Facility: HOSPITAL | Age: 65
End: 2025-06-13
Attending: NEUROLOGICAL SURGERY
Payer: MEDICARE

## 2025-06-13 DIAGNOSIS — R68.89 DECREASED STRENGTH, ENDURANCE, AND MOBILITY: ICD-10-CM

## 2025-06-13 DIAGNOSIS — Z74.09 DECREASED STRENGTH, ENDURANCE, AND MOBILITY: ICD-10-CM

## 2025-06-13 DIAGNOSIS — R53.1 DECREASED STRENGTH, ENDURANCE, AND MOBILITY: ICD-10-CM

## 2025-06-13 DIAGNOSIS — M48.062 LUMBAR STENOSIS WITH NEUROGENIC CLAUDICATION: ICD-10-CM

## 2025-06-13 DIAGNOSIS — M62.89 ABNORMAL INCREASED MUSCLE TIGHTNESS: Primary | ICD-10-CM

## 2025-06-13 DIAGNOSIS — M54.12 CERVICAL RADICULOPATHY: ICD-10-CM

## 2025-06-13 DIAGNOSIS — R29.3 POSTURAL IMBALANCE: ICD-10-CM

## 2025-06-13 DIAGNOSIS — M50.30 DEGENERATIVE DISC DISEASE, CERVICAL: ICD-10-CM

## 2025-06-13 PROBLEM — R52 PAIN: Status: RESOLVED | Noted: 2025-01-02 | Resolved: 2025-06-13

## 2025-06-13 PROCEDURE — 97140 MANUAL THERAPY 1/> REGIONS: CPT

## 2025-06-13 PROCEDURE — 97112 NEUROMUSCULAR REEDUCATION: CPT

## 2025-06-13 PROCEDURE — 97162 PT EVAL MOD COMPLEX 30 MIN: CPT

## 2025-06-13 NOTE — PROGRESS NOTES
Outpatient Rehab    Physical Therapy Evaluation    Patient Name: Elina Franco  MRN: [id number]  YOB: 1960  Encounter Date: 6/13/2025    Therapy Diagnosis:   Encounter Diagnoses   Name Primary?    Degenerative disc disease, cervical     Lumbar stenosis with neurogenic claudication     Cervical radiculopathy     Abnormal increased muscle tightness Yes    Decreased strength, endurance, and mobility     Postural imbalance      Physician: Solis Morales MD    Physician Orders: Eval and Treat  Medical Diagnosis: Degenerative disc disease, cervical  Lumbar stenosis with neurogenic claudication  Cervical radiculopathy  Surgical Diagnosis: s/p ACDF C 4-7   Surgical Date: Not applicable for this Episode    Visit # / Visits Authorized:  1 / 1  Insurance Authorization Period: 6/6/2025 to 6/6/2026  Date of Evaluation: 6/13/2025  Plan of Care Certification: 6/13/2025 to 8/8/2025     Time In: 0734   Time Out: 0830  Total Time (in minutes): 56   Total Billable Time (in minutes): 56    Intake Outcome Measure for FOTO Survey    Therapist reviewed FOTO scores for Elina Franco on 6/13/2025.   FOTO report - see Media section or FOTO account episode details.     Intake Score: 60%    Precautions:       Subjective   History of Present Illness  Elina is a 64 y.o. female who reports to physical therapy with a chief concern of neck pain.     The patient reports a medical diagnosis of Degenerative disc disease, cervical (M50.30), Lumbar stenosis with neurogenic claudication (M48.062), Cervical radiculopathy (M54.12).  Patient reports a surgery of s/p ACDF C 4-7.          History of Present Condition/Illness: Patient has a history of ACDF C4-7 on 12/2/2024. Patient reports that she is having neck pain which she describes as more tightness and stiffness in her neck. Patient reports that the last 2 months her neck has been real tight. Patient reports that she feels increased stiffness when waking up and when doing computer work.      Activities of Daily Living      General Prior Level of Function Comments: Independent and pain free with all ADL, IADL, community mobility and functional activities.  General Current Level of Function Comments: Independent with all ADL, IADL, community mobility and functional activities with reports of increased pain and need for increased time and frequent breaks.       Previously independent with activities of daily living? Yes     Currently independent with activities of daily living? Yes          Previously independent with instrumental activities of daily living? Yes     Currently independent with instrumental activities of daily living? Yes              Pain     Patient reports a current pain level of 6/10. Pain at best is reported as 3/10. Pain at worst is reported as 8/10.   Location: neck bilaterally, left>right  Clinical Progression (since onset): Stable  Pain Qualities: Aching, Other (Comment), Tightness  Other Pain Qualities: shooting  Pain-Relieving Factors: Lying down  Pain-Aggravating Factors: Computer work, Sleeping         Living Arrangements  Living Situation  Housing: Home independently  Living Arrangements: Spouse/significant other    Home Setup  Type of Structure: House        Employment  Patient does not report that: Does the patient's condition impact their ability to work?  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       Past Medical History/Physical Systems Review:   Elina Franco  has a past medical history of Anxiety, Hyperlipidemia, Hypertension, and Sleep apnea.    Elina Franco  has a past surgical history that includes Hysterectomy (1998); Epidural steroid injection into cervical spine (N/A, 02/21/2024); Epidural steroid injection into cervical spine (N/A, 06/12/2024); Carpal tunnel release (Bilateral); Cholecystectomy (2006); Bunionectomy (Left, 2018); fusion, spine, cervical, anterior approach (N/A, 12/2/2024); Decompression of  cervical spine by anterior approach with fusion (N/A, 12/2/2024); fusion, spine, anterior using interbody fusion system (N/A, 12/2/2024); Fusion of spine with instrumentation (N/A, 12/2/2024); surgical procurement,bone graft,spine (N/A, 12/2/2024); and Injection of anesthetic agent into sacroiliac joint (Right, 5/26/2025).    Elina has a current medication list which includes the following prescription(s): baclofen, diazepam, diazepam, gabapentin, irbesartan, pantoprazole, rosuvastatin, sertraline, mounjaro, and zolpidem.    Review of patient's allergies indicates:   Allergen Reactions    Statins-hmg-coa reductase inhibitors Swelling and Other (See Comments)     Lower extremity cramp with pravastatin in 2016.    Sulfa (sulfonamide antibiotics)         Objective          RANGE OF MOTION:   Cervical Right   (spine) Left    Pain/Dysfunction with Movement Goal   Cervical Flexion (60º) 3 finger distance --- Tension 2 finger distances   Cervical Extension (80º) 50 --- Pain 65   Cervical Side Bending (45º) 19 15 Tension 35   Cervical Rotation (75º) 50 42 Tension 60 B       Shoulder AROM/PROM Right Left Pain/Dysfunction with Movement Goal   Shoulder Flexion (180º) 170 170 Tension in neck region No tension   Shoulder Abduction (180º) 170 170 Tension in neck region  No tension   Shoulder Extension (60º) 60 60  -   Shoulder ER  at 90º (90º) 80 80  -   Shoulder IR at 90º (70º) 60 60  -     STRENGTH: (* denotes pain)  U/E MMT Right Left Dysfunction with Movement Goal   Shoulder Flexion 3+/5 3+/5 Tension in neck 4+/5 B   Shoulder Extension 4-/5 4-/5 Tension in neck 4+/5 B   Shoulder Abduction 3+/5 3+/5 Tension in neck 4+/5 B   Shoulder IR 3+/5 3+/5  4+/5 B   Shoulder ER 3+/5 3+/5  4+/5 B   Serratus Anterior 3-/5 3-/5  4+/5 B   Middle Trapezius 3-/5 3-/5  4+/5 B   Lower Trapezius 3-/5 3-/5  4+/5 B   Elbow Flexion  4-/5 4-/5  5/5 B   Elbow Extension 4-/5 4-/5  5/5 B       MUSCLE LENGTH:   Muscle Tested  Right Left  Limitation Goal    Upper Trapezius [] Normal  [x] Limited [] Normal  [x] Limited  Normal B   Levator Scapular  [] Normal  [x] Limited [] Normal  [x] Limited  Normal B   Scalenes [] Normal  [x] Limited [] Normal  [x] Limited  Normal B   Pectoralis Minor [] Normal  [x] Limited [] Normal  [x] Limited  Normal B   Pectoralis Major [] Normal  [x] Limited [] Normal  [x] Limited  Normal B     SENSATION:  Intact to Light Touch       PALPATION: Muscles: Increased tone and tenderness to palpation of: bilateral suboccipitals, paraspinals, scalenes , SCM, upper trapezius, levator scapulae , periscapular musculature, rhomboids , pectorals.      POSTURE:   Pt presents with postural abnormalities which include: forward head, rounded shoulders , and increased thoracic kyphosis     Treatment:        CPT Intervention Performed   Today Duration / Intensity   MT Soft Tissue Mobilizations: bilateral upper trapezius, SCM, scalenes, paraspinals, suboccipitals X     TE                             NMR Educated patient on the impairments present and the plan of care to address impairments x       Educated patient on the proper form and sequencing of all exercises provided in HEP today x       Chin Tucks- supine X 3x10 reps 3 sec holds     Cervical Retractions- supine X 3x10 reps      Open Books X X10 reps 5 sec holds      Discussed modifying work space ergonomics, sleeping postures including changing pillows x     TA                                                                               PLAN UBE  Series 6  Cervical Isometrics  Flexion Punches  Flexion and Abduction to 90 standing             CPT Codes available for Billing:   (12) minutes of Manual therapy (MT) to improve pain and ROM.  (00) minutes of Therapeutic Exercise (TE) to develop strength, endurance, range of motion, and flexibility.  (23) minutes of Neuromuscular Re-Education (NMR)  to improve: Balance, Coordination, Kinesthetic, Sense, Proprioception, and Posture.  (00) minutes of  Therapeutic Activities (TA) to improve functional performance.  Vasopneumatic Device Therapy () for management of swelling/edema. (38681)  Unattended Electrical Stimulation (ES) for muscle performance or pain modulation.  BFR: Blood flow restriction applied during exercise         Assessment & Plan   Assessment  Elina presents with a condition of Moderate complexity.   Presentation of Symptoms: Evolving            Prognosis: Good  Prognosis Details: Anticipated Barriers for therapy: co-morbidities, sedentary lifestyle, chronicity of condition, and occupation   Assessment Details: Pt presents with impairments in the following categories: IMPAIRMENTS: ROM, strength, endurance, muscle length, posture, core strength and stability, functional movement patterns, and scar tissue. Pt will benefit from skilled outpatient Physical Therapy to address the deficits stated above, provide pt/family education, and to maximize pt's level of independence.    Plan  From a physical therapy perspective, the patient would benefit from: Skilled Rehab Services                Visit Frequency: 2 times Per Week for 8 Weeks.       This plan was discussed with Patient.   Discussion participants: Agreed Upon Plan of Care  Plan details: Outpatient Physical Therapy to include any combination of the following interventions: virtual visits, dry needling, modalities, electrical stimulation (IFC, Pre-Mod, Attended with Functional Dry Needling), Cervical/Lumbar Traction, Manual Therapy, Neuromuscular Re-ed, Patient Education, Self Care, Therapeutic Exercise, and Therapeutic Activites           The patient's spiritual, cultural, and educational needs were considered, and the patient is agreeable to the plan of care and goals.     Education  Education was done with Patient. The patient's learning style includes Demonstration and Listening. The patient Demonstrates understanding and Verbalizes understanding.         PURPOSE: Patient educated on the  impairments noted above and the effects of physical therapy intervention to improve overall condition and QOL.  EXERCISE: Patient was educated on all the above exercise prior/during/after for proper posture, positioning, and execution for safe performance with home exercise program.  STRENGTH: Patient educated on the importance of improved core and extremity strength in order to improve alignment of the spine and extremities with static positions and dynamic movement.  POSTURE: Patient educated on postural awareness to reduce stress and maintain optimal alignment of the spine with static positions and dynamic movement  SLEEPING POSITIONS: Patient educated on the use of pillows to aid in neutral alignment of spine and extremities when sleeping in supine or side lying. ERGONOMICS: Patient educated on proper ergonomics at the work station in order to maintain optimal alignment of the musculoskeletal system and improve efficiency in the work environment. POLICIES: Educated patient on scheduling, cancelation and no-show policy.        Goals:   Active       Long Term Goals       Patient will improve strength to at least 4+/5 grossly,  in order to improve functional independence and quality of life.         Start:  06/13/25    Expected End:  08/08/25            Patient will improve AROM to stated goals, listed in objective measures above, in order to return to maximal functional potential and improve quality of life.        Start:  06/13/25    Expected End:  08/08/25            Pt will report worst pain of 2/10 in order to progress toward max functional ability and improve quality of life                Start:  06/13/25    Expected End:  08/08/25               Short Term Goals       Patient will improve strength by 1/2 a grade, in order to progress towards independence with functional activities.                Start:  06/13/25    Expected End:  07/11/25            Pt will report worst pain of 4/10 in order to progress  toward max functional ability and improve quality of life                Start:  06/13/25    Expected End:  07/11/25            Patient will correct postural deviations in sitting and standing, to decrease pain and promote long term stability.                 Start:  06/13/25    Expected End:  07/11/25                Sera Mathis, PT

## 2025-06-20 ENCOUNTER — CLINICAL SUPPORT (OUTPATIENT)
Dept: REHABILITATION | Facility: HOSPITAL | Age: 65
End: 2025-06-20
Payer: MEDICARE

## 2025-06-20 DIAGNOSIS — R29.3 POSTURAL IMBALANCE: ICD-10-CM

## 2025-06-20 DIAGNOSIS — M62.89 ABNORMAL INCREASED MUSCLE TIGHTNESS: Primary | ICD-10-CM

## 2025-06-20 PROCEDURE — 97140 MANUAL THERAPY 1/> REGIONS: CPT

## 2025-06-20 PROCEDURE — 97112 NEUROMUSCULAR REEDUCATION: CPT

## 2025-06-20 NOTE — PROGRESS NOTES
Outpatient Rehab    Physical Therapy Visit    Patient Name: Elina Franco  MRN: [id number]  YOB: 1960  Encounter Date: 6/20/2025    Therapy Diagnosis:   Encounter Diagnoses   Name Primary?    Abnormal increased muscle tightness Yes    Postural imbalance      Physician: Solis Morales MD    Physician Orders: Eval and Treat  Medical Diagnosis: Degenerative disc disease, cervical  Lumbar stenosis with neurogenic claudication  Cervical radiculopathy  Surgical Diagnosis: s/p ACDF C 4-7   Surgical Date: Not applicable for this Episode  Days Since Last Surgery: Not applicable for this Episode    Visit # / Visits Authorized:  1 / 8  Insurance Authorization Period: 6/13/2025 to 12/31/2025  Date of Evaluation: 6/13/2025  Plan of Care Certification: 6/13/2025 to 8/8/2025      PT/PTA:     Number of PTA visits since last PT visit:   Time In: 0731   Time Out: 0830  Total Time (in minutes): 59   Total Billable Time (in minutes): 59    FOTO:  Intake Score:  %  Survey Score 2:  %  Survey Score 3:  %    Precautions:       Subjective   Patient reports that she is feeling stiffness in her neck..  Pain reported as 6/10.      Objective            Treatment:        CPT Intervention Performed   Today Duration / Intensity   MT Soft Tissue Mobilizations: bilateral upper trapezius, SCM, scalenes, paraspinals, suboccipitals X       Functional Dry Needling X Written and verbal consent provided from patient prior to intervention     Performed to bilateral upper trapezius   TE                             NMR UBE X 8 minutes forward (3 minutes forward, 3 minutes backward next time)               Chin Tucks- supine X 3x10 reps 3 sec holds     Cervical Retractions- seated X 3x10 reps      Open Books X X10 reps 5 sec holds      Series 6 X 3 minutes pec stretch, 1 minutes each following   TA                                                                               PLAN Cervical Isometrics  Flexion Punches  Flexion and Abduction to  90 standing             CPT Codes available for Billing:   (25) minutes of Manual therapy (MT) to improve pain and ROM.  (00) minutes of Therapeutic Exercise (TE) to develop strength, endurance, range of motion, and flexibility.  (34) minutes of Neuromuscular Re-Education (NMR)  to improve: Balance, Coordination, Kinesthetic, Sense, Proprioception, and Posture.  (00) minutes of Therapeutic Activities (TA) to improve functional performance.  Vasopneumatic Device Therapy () for management of swelling/edema. (63087)  Unattended Electrical Stimulation (ES) for muscle performance or pain modulation.  BFR: Blood flow restriction applied during exercise         Assessment & Plan   Assessment: Patient with good response to Dry Needling performed with no adverse reactions noted. Associated muscle soreness as expected. Educated patient to hydrate well to assist with improving this. Worked on postural mobility and light cervical muscle activation. Cueing provided throughout to assist with improving form and sequencing.   Evaluation/Treatment Tolerance: Patient tolerated treatment well    The patient will continue to benefit from skilled outpatient physical therapy in order to address the deficits listed in the problem list on the initial evaluation, provide patient and family education, and maximize the patients level of independence in the home and community environments.     The patient's spiritual, cultural, and educational needs were considered, and the patient is agreeable to the plan of care and goals.           Plan: Continue POC and frequency. Progress as tolerated.    Goals:   Active       Long Term Goals       Patient will improve strength to at least 4+/5 grossly,  in order to improve functional independence and quality of life.         Start:  06/13/25    Expected End:  08/08/25            Patient will improve AROM to stated goals, listed in objective measures above, in order to return to maximal functional  potential and improve quality of life.        Start:  06/13/25    Expected End:  08/08/25            Pt will report worst pain of 2/10 in order to progress toward max functional ability and improve quality of life                Start:  06/13/25    Expected End:  08/08/25               Short Term Goals       Patient will improve strength by 1/2 a grade, in order to progress towards independence with functional activities.                Start:  06/13/25    Expected End:  07/11/25            Pt will report worst pain of 4/10 in order to progress toward max functional ability and improve quality of life                Start:  06/13/25    Expected End:  07/11/25            Patient will correct postural deviations in sitting and standing, to decrease pain and promote long term stability.                 Start:  06/13/25    Expected End:  07/11/25                Sera Mathis, PT

## 2025-07-01 ENCOUNTER — CLINICAL SUPPORT (OUTPATIENT)
Dept: REHABILITATION | Facility: HOSPITAL | Age: 65
End: 2025-07-01
Payer: MEDICARE

## 2025-07-01 DIAGNOSIS — R29.3 POSTURAL IMBALANCE: ICD-10-CM

## 2025-07-01 DIAGNOSIS — M62.89 ABNORMAL INCREASED MUSCLE TIGHTNESS: Primary | ICD-10-CM

## 2025-07-01 PROCEDURE — 97140 MANUAL THERAPY 1/> REGIONS: CPT

## 2025-07-01 PROCEDURE — 97112 NEUROMUSCULAR REEDUCATION: CPT

## 2025-07-01 PROCEDURE — 20560 NDL INSJ W/O NJX 1 OR 2 MUSC: CPT

## 2025-07-01 NOTE — PROGRESS NOTES
Outpatient Rehab    Physical Therapy Visit    Patient Name: Elina Franco  MRN: [id number]  YOB: 1960  Encounter Date: 7/1/2025    Therapy Diagnosis:   Encounter Diagnoses   Name Primary?    Abnormal increased muscle tightness Yes    Postural imbalance      Physician: Solis Morales MD    Physician Orders: Eval and Treat  Medical Diagnosis: Degenerative disc disease, cervical  Lumbar stenosis with neurogenic claudication  Cervical radiculopathy  Surgical Diagnosis: s/p ACDF C 4-7   Surgical Date: Not applicable for this Episode  Days Since Last Surgery: Not applicable for this Episode    Visit # / Visits Authorized:  2 / 8  Insurance Authorization Period: 6/13/2025 to 12/31/2025  Date of Evaluation: 6/13/2025  Plan of Care Certification: 6/13/2025 to 8/8/2025      PT/PTA:     Number of PTA visits since last PT visit:   Time In: 0833   Time Out: 0935  Total Time (in minutes): 62   Total Billable Time (in minutes): 58    FOTO:  Intake Score:  %  Survey Score 2:  %  Survey Score 3:  %    Precautions:       Subjective   Patient reports that she is doing so so. Patient reports that the left side of her neck was aching so bad yesterday (9/10)..         Objective            Treatment:        CPT Intervention Performed   Today Duration / Intensity   MT Soft Tissue Mobilizations: bilateral upper trapezius, SCM, scalenes, paraspinals, suboccipitals X               TE                             NMR UBE X 3 minutes forward, 3 minutes backward                Chin Tucks- supine X 3x10 reps 3 sec holds     Cervical Retractions- seated X 3x10 reps      Open Books X X10 reps 5 sec holds      Series 6 X 3 minutes pec stretch, 1 minutes each following     Rows X 2x10 reps green band     Shoulder Extensions X 2x10 reps green band     Functional Dry Needling x Written and verbal consent provided from patient prior to intervention     Performed to bilateral upper trapezius   TA                                                                                PLAN Cervical Isometrics  Flexion Punches  Flexion and Abduction to 90 standing             CPT Codes available for Billing:   (12) minutes of Manual therapy (MT) to improve pain and ROM.  (00) minutes of Therapeutic Exercise (TE) to develop strength, endurance, range of motion, and flexibility.  (46) minutes of Neuromuscular Re-Education (NMR)  to improve: Balance, Coordination, Kinesthetic, Sense, Proprioception, and Posture.  (00) minutes of Therapeutic Activities (TA) to improve functional performance.  Vasopneumatic Device Therapy () for management of swelling/edema. (83006)  Unattended Electrical Stimulation (ES) for muscle performance or pain modulation.  BFR: Blood flow restriction applied during exercise          Assessment & Plan   Assessment: Continued relief of pain and muscle tension following Dry Needling with electrical stimulation for neuromuscular re-education. Continued to work on soft tissue restrictions with soft tissue mobilizations. Continued to focus on postural mobility and strengthening as well with incorporation of resistance exercises. Cueing provided throughout to improve form and decrease compensations present.        The patient will continue to benefit from skilled outpatient physical therapy in order to address the deficits listed in the problem list on the initial evaluation, provide patient and family education, and maximize the patients level of independence in the home and community environments.     The patient's spiritual, cultural, and educational needs were considered, and the patient is agreeable to the plan of care and goals.           Plan: Continue POC and frequency. Progress as tolerated.    Goals:   Active       Long Term Goals       Patient will improve strength to at least 4+/5 grossly,  in order to improve functional independence and quality of life.         Start:  06/13/25    Expected End:  08/08/25            Patient will  improve AROM to stated goals, listed in objective measures above, in order to return to maximal functional potential and improve quality of life.        Start:  06/13/25    Expected End:  08/08/25            Pt will report worst pain of 2/10 in order to progress toward max functional ability and improve quality of life                Start:  06/13/25    Expected End:  08/08/25               Short Term Goals       Patient will improve strength by 1/2 a grade, in order to progress towards independence with functional activities.                Start:  06/13/25    Expected End:  07/11/25            Pt will report worst pain of 4/10 in order to progress toward max functional ability and improve quality of life                Start:  06/13/25    Expected End:  07/11/25            Patient will correct postural deviations in sitting and standing, to decrease pain and promote long term stability.                 Start:  06/13/25    Expected End:  07/11/25                Sera Mathis, PT

## 2025-07-22 ENCOUNTER — PATIENT MESSAGE (OUTPATIENT)
Dept: NEUROSURGERY | Facility: CLINIC | Age: 65
End: 2025-07-22
Payer: MEDICARE

## (undated) DEVICE — Device

## (undated) DEVICE — POUCH INSTRUMENT 2 POCKET

## (undated) DEVICE — SUT VICRYL PLUS 0 CT1 18IN

## (undated) DEVICE — TOOL MR8 MATCH HEAD 14CM 3MM

## (undated) DEVICE — SUT MCRYL PLUS 4-0 PS2 27IN

## (undated) DEVICE — CORD BIPOLAR 12 FOOT

## (undated) DEVICE — TOWEL OR DISP STRL BLUE 4/PK

## (undated) DEVICE — DRAPE THYROID SOFT STERILE

## (undated) DEVICE — HEADREST ROUND DISP FOAM 9IN

## (undated) DEVICE — COVER LIGHT HANDLE 80/CA

## (undated) DEVICE — DRAPE THREE-QTR REINF 53X77IN

## (undated) DEVICE — HALTER DELUXE DISCARD HEAD

## (undated) DEVICE — DRAPE OPMI STERILE

## (undated) DEVICE — DRAPE MOBILE C-ARM

## (undated) DEVICE — BLADE EZ CLEAN 2.5IN MODIFIED

## (undated) DEVICE — NDL SPINAL 20GX3.5 HUB

## (undated) DEVICE — PIN DISTRACTION 12MM
Type: IMPLANTABLE DEVICE | Site: SPINE CERVICAL | Status: NON-FUNCTIONAL
Removed: 2024-12-02

## (undated) DEVICE — NDL ECLIPSE SAF REG 25GX1.5IN

## (undated) DEVICE — SPONGE GAUZE 16PLY 4X4

## (undated) DEVICE — ELECTRODE REM PLYHSV RETURN 9

## (undated) DEVICE — SYS SURGIPHOR STRL IRRG

## (undated) DEVICE — DRESSING SURGICAL 1/2X1/2

## (undated) DEVICE — MANIFOLD 4 PORT

## (undated) DEVICE — MARKER SKIN RULER STERILE

## (undated) DEVICE — SPONGE COTTON TRAY 4X4IN

## (undated) DEVICE — GOWN POLY REINF BRTH SLV XL

## (undated) DEVICE — DRAPE INCISE IOBAN 2 23X17IN

## (undated) DEVICE — ALCOHOL 70% ANTISEPTIC ISO 4OZ

## (undated) DEVICE — GAUZE SPONGE PEANUT STRL

## (undated) DEVICE — SUT BONE WAX 2.5 GRMS 12/BX

## (undated) DEVICE — ADHESIVE DERMABOND ADVANCED

## (undated) DEVICE — ADHESIVE MASTISOL VIAL 48/BX

## (undated) DEVICE — REMOVER LOTION

## (undated) DEVICE — KIT SURGIFLO HEMOSTATIC MATRIX

## (undated) DEVICE — SYR ONLY LUER LOCK 20CC

## (undated) DEVICE — PACK BASIC SETUP SC BR

## (undated) DEVICE — BUR LEGEND MIDAS REX 14CM 13MM

## (undated) DEVICE — COLLAR VISTA AD UNIV REPL PADS

## (undated) DEVICE — TUBING MEDI-VAC 20FT .25IN

## (undated) DEVICE — KIT EVACUATOR 3-SPRING 1/8 DRN